# Patient Record
Sex: FEMALE | Race: WHITE
[De-identification: names, ages, dates, MRNs, and addresses within clinical notes are randomized per-mention and may not be internally consistent; named-entity substitution may affect disease eponyms.]

---

## 2017-01-03 ENCOUNTER — HOSPITAL ENCOUNTER (EMERGENCY)
Dept: HOSPITAL 61 - ER | Age: 82
Discharge: HOME | End: 2017-01-03
Payer: MEDICARE

## 2017-01-03 VITALS
SYSTOLIC BLOOD PRESSURE: 147 MMHG | DIASTOLIC BLOOD PRESSURE: 68 MMHG | SYSTOLIC BLOOD PRESSURE: 147 MMHG | DIASTOLIC BLOOD PRESSURE: 68 MMHG | SYSTOLIC BLOOD PRESSURE: 147 MMHG | DIASTOLIC BLOOD PRESSURE: 68 MMHG

## 2017-01-03 DIAGNOSIS — Z88.0: ICD-10-CM

## 2017-01-03 DIAGNOSIS — I10: ICD-10-CM

## 2017-01-03 DIAGNOSIS — E11.9: ICD-10-CM

## 2017-01-03 DIAGNOSIS — M79.604: Primary | ICD-10-CM

## 2017-01-03 DIAGNOSIS — E78.00: ICD-10-CM

## 2017-01-03 DIAGNOSIS — I95.9: ICD-10-CM

## 2017-01-03 PROCEDURE — 73590 X-RAY EXAM OF LOWER LEG: CPT

## 2017-01-03 PROCEDURE — 99284 EMERGENCY DEPT VISIT MOD MDM: CPT

## 2017-01-03 PROCEDURE — 73502 X-RAY EXAM HIP UNI 2-3 VIEWS: CPT

## 2017-01-03 NOTE — PHYS DOC
Past Medical History


Past Medical History:  Diabetes-Type II, High Cholesterol, Hypertension, 

Hypotension


Past Surgical History:  Cholecystectomy


Alcohol Use:  None


Drug Use:  None





Adult General


Chief Complaint


Chief Complaint:  LOWER EXT PAIN





HPI


HPI


Patient is a 81  year old female who presents with complaint of right lower 

extremity pain. Patient states that she has been having pain from the lower 

portion of her knee down to her ankle for the past 4 days. Patient states that 

the pain stays in the front over her shin bone. Patient denies any swelling to 

the right lower extremity or pain in her calf. Patient states that the pain has 

been getting progressively worse with weightbearing. Patient states that at her 

baseline she does not use a cane or walker, however she has had use a cane due 

to the pain over the past 2 days. Patient denies any recent injuries. Patient 

rates her pain currently as 9 out of 10 with weightbearing and 3 out of 10 with 

rest. Patient has been taking Tylenol at home with minimal relief in symptoms.





Review of Systems


Review of Systems





Constitutional: Denies fever or chills []


Eyes: Denies change in visual acuity, redness, or eye pain []


HENT: Denies nasal congestion or sore throat []


Respiratory: Denies cough or shortness of breath []


Cardiovascular: No additional information not addressed in HPI []


GI: Denies abdominal pain, nausea, vomiting, bloody stools or diarrhea []


: Denies dysuria or hematuria []


Musculoskeletal: Right lower leg pain []


Integument: Denies rash or skin lesions []


Neurologic: Denies headache, focal weakness or sensory changes []


Endocrine: Denies polyuria or polydipsia []





Allergies


Allergies





 Allergies








Coded Allergies Type Severity Reaction Last Updated Verified


 


  Penicillins Allergy Unknown  3/30/14 Yes











Physical Exam


Physical Exam





Constitutional: Well developed, well nourished, no acute distress, non-toxic 

appearance. []


HENT: Normocephalic, atraumatic, bilateral external ears normal, oropharynx 

moist, no oral exudates, nose normal. []


Eyes: PERRLA, EOMI, conjunctiva normal, no discharge. [] 


Neck: Normal range of motion, no tenderness, supple, no stridor. [] 


Cardiovascular:Heart rate regular rhythm, no murmur []


Lungs & Thorax:  Bilateral breath sounds clear to auscultation []


Abdomen: Bowel sounds normal, soft, no tenderness, no masses, no pulsatile 

masses. [] 


Skin: Warm, dry, no erythema, no rash. [] 


Back: No tenderness, no CVA tenderness. [] 


Extremities: Right anterior tibial plateau tenderness to palpation, mid tibial 

tenderness to palpation, right greater trochanter tenderness to palpation, no 

cyanosis, no clubbing, ROM intact, no edema. [] 


Neurologic: Alert and oriented X 3, normal motor function, normal sensory 

function, no focal deficits noted. []





Current Patient Data


Vital Signs





 Vital Signs








  Date Time  Temp Pulse Resp B/P Pulse Ox O2 Delivery O2 Flow Rate FiO2


 


1/3/17 13:06 97.7 78 18  96 Room Air  





 97.7       











EKG


EKG


Not performed []





Radiology/Procedures


Radiology/Procedures





 [place] [id number]


 [phone]


 


 IMAGING REPORT





 Signed





PATIENT: KAREN ZEPEDA ACCOUNT: [id number] MRN#: [id number]


: 1935 LOCATION: ER AGE: 81


SEX: F EXAM DT: 17 ACCESSION#: [id number]


STATUS: REG ER ORD. PHYSICIAN: SOHEILA BAZZI MD 


REASON: right hip and right lower leg pain since last friday, no known injury


PROCEDURE: TIBIA FIBULA RIGHT











Right tibia and fibula, 2 views, 1/3/2017:





History: Hip and lower leg pain





No fracture or destructive bony lesion is seen. There is minimal spurring at


the knee joint. Arterial calcifications are evident.








IMPRESSION: No acute abnormality is detected.

















DICTATED and SIGNED BY:     MORITZ,RICK S MD


DATE:     17 1440





CC: KATIA BRANNON; SOHEILA BAZZI MD ~





 [address] [address]


 [phone]


 


 IMAGING REPORT





 Signed





PATIENT: KAREN ZEPEDA ACCOUNT: [id number] MRN#: [id number]


: 1935 LOCATION: ER AGE: 81


SEX: F EXAM DT: 17 ACCESSION#: [id number]


STATUS: REG ER ORD. PHYSICIAN: SOHEILA BAZZI MD 


REASON: right hip and right lower leg pain since last friday, no known injury


PROCEDURE: HIP RIGHT 2V WITH PELVIS











Pelvis with right hip, 3 views, 1/3/2017:





History: Hip and lower leg pain





No fracture or dislocation is identified. The hip joint spaces are minimally


narrowed with mild marginal spurring. Mild degenerative changes are present in


the lower lumbar spine. Scattered arterial calcifications are evident.





IMPRESSION: No acute pelvic or right hip abnormality is detected.

















DICTATED and SIGNED BY:     MORITZ,RICK S MD


DATE:     17 1442





CC: KATIA BRANNON; SOHEILA BAZZI MD ~


[]





Course & Med Decision Making


Course & Med Decision Making


Pertinent Labs and Imaging studies reviewed. (See chart for details)





Patient's x-rays were negative for fracture. The patient may still have either 

a bony contusion or possibly a stress fracture that is unseen on the x-rays. At 

this time I recommended limited weightbearing to the affected extremity and use 

of roller walker cane to assist with ambulation. The patient was provided with 

a prescription for hydrocodone to help with pain. I advised follow-up in 5-7 

days a primary doctor and return to the emergency department for any worsening 

symptoms. Patient voiced understanding and in agreement with treatment plan.





Dragon Disclaimer


Dragon Disclaimer


This electronic medical record was generated, in whole or in part, using a 

voice recognition dictation system.





Departure


Departure


Impression:  


 Primary Impression:  


 Pain of right lower extremity


Disposition:   HOME, SELF-CARE


Condition:  IMPROVED


Referrals:  


KATIA BRANNON (PCP)


Patient Instructions:  Musculoskeletal Pain





Additional Instructions:


Follow-up with your primary doctor in the next 5-7 days. Limit weightbearing 

and walking on your right leg to necessary activities such as using the restroom

, dressing herself, or making food. Return to the emergency department for any 

worsening symptoms.


Scripts


Hydrocodone/Apap 5-325 (Norco 5-325 Tablet)1 Each Tablet1 Tab PO Q4-6HRS PRN 

PAIN #20 TAB


   Prov:SOHEILA BAZZI MD         1/3/17








SOHEILA BAZZI MD Vincenzo 3, 2017 15:14

## 2017-01-03 NOTE — RAD
Right tibia and fibula, 2 views, 1/3/2017:



History: Hip and lower leg pain



No fracture or destructive bony lesion is seen. There is minimal spurring at

the knee joint. Arterial calcifications are evident.





IMPRESSION: No acute abnormality is detected.

## 2017-01-03 NOTE — RAD
Pelvis with right hip, 3 views, 1/3/2017:



History: Hip and lower leg pain



No fracture or dislocation is identified. The hip joint spaces are minimally

narrowed with mild marginal spurring. Mild degenerative changes are present in

the lower lumbar spine. Scattered arterial calcifications are evident.



IMPRESSION: No acute pelvic or right hip abnormality is detected.

## 2017-01-23 ENCOUNTER — HOSPITAL ENCOUNTER (OUTPATIENT)
Dept: HOSPITAL 61 - KCIC MRI | Age: 82
Discharge: HOME | End: 2017-01-23
Attending: PHYSICIAN ASSISTANT
Payer: MEDICARE

## 2017-01-23 DIAGNOSIS — S83.281A: Primary | ICD-10-CM

## 2017-01-23 DIAGNOSIS — M17.11: ICD-10-CM
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
## 2017-01-23 DIAGNOSIS — M25.761: ICD-10-CM
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
## 2017-01-23 DIAGNOSIS — M25.461: ICD-10-CM
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
## 2017-01-23 DIAGNOSIS — M94.261: ICD-10-CM

## 2017-01-23 PROCEDURE — 73721 MRI JNT OF LWR EXTRE W/O DYE: CPT

## 2017-01-23 NOTE — KCIC
PROCEDURE 

MR of the right knee 

 

HISTORY 

Acute right knee pain. Pain is anterior and extends down the leg. 

 

COMPARISON 

None 

 

TECHNIQUE 

Standard noncontrast images are obtained. 

 

FINDINGS 

No evidence of a medial meniscal tear. 

There is some signal at the anterior horn of the lateral meniscus, with 

surface violation compatible with a small tear. 

The anterior and posterior cruciate ligaments are intact. Medial 

collateral ligament is intact. Iliotibial band unremarkable. Fibular 

collateral ligament, biceps femoris tendon and popliteus tendon are 

intact. The extensor mechanism is intact. Small joint effusion. 

No evidence of an osteochondral loose body. Small joint effusion. 

At least moderate chondromalacia of the medial and lateral joint 

compartments. There is a subchondral osteophyte of the weight-bearing 

lateral femoral condyle 

Severe chondromalacia patellofemoral joint. 

No bone lesion. No acute fracture. Tiny Baker cyst. 

 

IMPRESSION 

1. Tear of the anterior horn of the lateral meniscus.

2. Severe primary osteoarthritis.

 

 

 

Electronically signed by: King Aldana MD (Jan 23, 2017 12:07:28)

## 2017-03-29 ENCOUNTER — HOSPITAL ENCOUNTER (OUTPATIENT)
Dept: HOSPITAL 61 - MAMMO | Age: 82
Discharge: HOME | End: 2017-03-29
Attending: FAMILY MEDICINE
Payer: MEDICARE

## 2017-03-29 DIAGNOSIS — Z12.31: Primary | ICD-10-CM

## 2017-03-29 NOTE — RAD
DATE: 3/29/2017



EXAM: DIGITAL SCREEN BILAT W/CAD



HISTORY: Routine screening



COMPARISON: 3/1/2016



This study was interpreted with the benefit of Computerized Aided Detection

(CAD).





FINDINGS: There are scattered fibroglandular densities in both breasts. No new

or enlarging breast densities are seen. Numerous benign type calcifications

are again noted. No suspicious microcalcifications have developed.  





IMPRESSION: Stable mammograms without evidence of malignancy.





BI-RADS CATEGORY: 2 BENIGN FINDING(S)



RECOMMENDED FOLLOW-UP: 12M 12 MONTH FOLLOW-UP



PQRS compliance statement: Patient information was entered into a reminder

system with a target due date     for the next mammogram.



Mammography is a sensitive method for finding small breast cancers, but it

does not detect them all and is not a substitute for careful clinical

examination.  A negative mammogram does not negate a clinically suspicious

finding and should not result in delay in biopsying a clinically suspicious

abnormality.



"Our facility is accredited by the American [employer] Mammography

Program."

## 2017-07-20 ENCOUNTER — HOSPITAL ENCOUNTER (OUTPATIENT)
Dept: HOSPITAL 61 - ER | Age: 82
Setting detail: OBSERVATION
LOS: 1 days | Discharge: HOME | End: 2017-07-21
Attending: INTERNAL MEDICINE | Admitting: INTERNAL MEDICINE
Payer: MEDICARE

## 2017-07-20 VITALS — HEIGHT: 61 IN | BODY MASS INDEX: 27.38 KG/M2 | WEIGHT: 145 LBS

## 2017-07-20 VITALS — SYSTOLIC BLOOD PRESSURE: 170 MMHG | DIASTOLIC BLOOD PRESSURE: 64 MMHG

## 2017-07-20 DIAGNOSIS — M19.90: ICD-10-CM

## 2017-07-20 DIAGNOSIS — E11.22: ICD-10-CM

## 2017-07-20 DIAGNOSIS — E78.5: ICD-10-CM

## 2017-07-20 DIAGNOSIS — R07.89: Primary | ICD-10-CM

## 2017-07-20 DIAGNOSIS — Z82.49: ICD-10-CM

## 2017-07-20 DIAGNOSIS — N18.3: ICD-10-CM

## 2017-07-20 DIAGNOSIS — E03.9: ICD-10-CM

## 2017-07-20 DIAGNOSIS — Z83.3: ICD-10-CM

## 2017-07-20 DIAGNOSIS — E78.00: ICD-10-CM

## 2017-07-20 DIAGNOSIS — K21.9: ICD-10-CM

## 2017-07-20 DIAGNOSIS — I12.9: ICD-10-CM

## 2017-07-20 LAB
ALBUMIN SERPL-MCNC: 3.5 G/DL (ref 3.4–5)
ALP SERPL-CCNC: 140 U/L (ref 46–116)
ALT SERPL-CCNC: 47 U/L (ref 14–59)
ANION GAP SERPL CALC-SCNC: 12 MMOL/L (ref 6–14)
ANISOCYTOSIS BLD QL SMEAR: SLIGHT
AST SERPL-CCNC: 119 U/L (ref 15–37)
BACTERIA #/AREA URNS HPF: (no result) /HPF
BASOPHILS # BLD AUTO: 0.1 X10^3/UL (ref 0–0.2)
BASOPHILS NFR BLD: 1 % (ref 0–3)
BILIRUB DIRECT SERPL-MCNC: 0.1 MG/DL (ref 0–0.2)
BILIRUB SERPL-MCNC: 0.2 MG/DL (ref 0.2–1)
BILIRUB UR QL STRIP: NEGATIVE
BUN SERPL-MCNC: 40 MG/DL (ref 7–20)
CALCIUM SERPL-MCNC: 9.4 MG/DL (ref 8.5–10.1)
CHLORIDE SERPL-SCNC: 103 MMOL/L (ref 98–107)
CK SERPL-CCNC: 95 U/L (ref 26–192)
CKMB MASS: 0.7 NG/ML (ref 0–3.6)
CO2 SERPL-SCNC: 25 MMOL/L (ref 21–32)
CREAT SERPL-MCNC: 1.8 MG/DL (ref 0.6–1)
EOSINOPHIL NFR BLD: 4 % (ref 0–3)
ERYTHROCYTE [DISTWIDTH] IN BLOOD BY AUTOMATED COUNT: 20.6 % (ref 11.5–14.5)
GFR SERPLBLD BASED ON 1.73 SQ M-ARVRAT: 27 ML/MIN
GLUCOSE SERPL-MCNC: 202 MG/DL (ref 70–99)
GLUCOSE UR STRIP-MCNC: NEGATIVE MG/DL
HCT VFR BLD CALC: 33.5 % (ref 36–47)
HGB BLD-MCNC: 10.5 G/DL (ref 12–15.5)
INR PPP: 1 (ref 0.8–1.1)
LYMPHOCYTES # BLD: 3.3 X10^3/UL (ref 1–4.8)
LYMPHOCYTES NFR BLD AUTO: 32 % (ref 24–48)
MAGNESIUM SERPL-MCNC: 2.4 MG/DL (ref 1.8–2.4)
MCH RBC QN AUTO: 25 PG (ref 25–35)
MCHC RBC AUTO-ENTMCNC: 31 G/DL (ref 31–37)
MCV RBC AUTO: 81 FL (ref 79–100)
MONOCYTES NFR BLD: 11 % (ref 0–9)
NEUTROPHILS NFR BLD AUTO: 52 % (ref 31–73)
NITRITE UR QL STRIP: NEGATIVE
OVALOCYTES BLD QL SMEAR: (no result)
PH UR STRIP: 6 [PH]
PLATELET # BLD AUTO: 269 X10^3/UL (ref 140–400)
PLATELET # BLD EST: ADEQUATE 10*3/UL
POIKILOCYTOSIS BLD QL SMEAR: SLIGHT
POTASSIUM SERPL-SCNC: 4.7 MMOL/L (ref 3.5–5.1)
PROT SERPL-MCNC: 8.2 G/DL (ref 6.4–8.2)
PROT UR STRIP-MCNC: NEGATIVE MG/DL
PROTHROMBIN TIME: 12.6 SEC (ref 11.7–14)
RBC # BLD AUTO: 4.14 X10^6/UL (ref 3.5–5.4)
RBC #/AREA URNS HPF: 0 /HPF (ref 0–2)
SODIUM SERPL-SCNC: 140 MMOL/L (ref 136–145)
SP GR UR STRIP: 1.01
SQUAMOUS #/AREA URNS LPF: (no result) /LPF
UROBILINOGEN UR-MCNC: 0.2 MG/DL
WBC # BLD AUTO: 10.2 X10^3/UL (ref 4–11)

## 2017-07-20 PROCEDURE — 81001 URINALYSIS AUTO W/SCOPE: CPT

## 2017-07-20 PROCEDURE — 85027 COMPLETE CBC AUTOMATED: CPT

## 2017-07-20 PROCEDURE — 85610 PROTHROMBIN TIME: CPT

## 2017-07-20 PROCEDURE — 93005 ELECTROCARDIOGRAM TRACING: CPT

## 2017-07-20 PROCEDURE — 80061 LIPID PANEL: CPT

## 2017-07-20 PROCEDURE — 83880 ASSAY OF NATRIURETIC PEPTIDE: CPT

## 2017-07-20 PROCEDURE — 84443 ASSAY THYROID STIM HORMONE: CPT

## 2017-07-20 PROCEDURE — 96372 THER/PROPH/DIAG INJ SC/IM: CPT

## 2017-07-20 PROCEDURE — 80069 RENAL FUNCTION PANEL: CPT

## 2017-07-20 PROCEDURE — 80048 BASIC METABOLIC PNL TOTAL CA: CPT

## 2017-07-20 PROCEDURE — 82553 CREATINE MB FRACTION: CPT

## 2017-07-20 PROCEDURE — A6539 GC STOCKING WAISTLNGTH 18-30: HCPCS

## 2017-07-20 PROCEDURE — 80076 HEPATIC FUNCTION PANEL: CPT

## 2017-07-20 PROCEDURE — 83690 ASSAY OF LIPASE: CPT

## 2017-07-20 PROCEDURE — 96376 TX/PRO/DX INJ SAME DRUG ADON: CPT

## 2017-07-20 PROCEDURE — 96361 HYDRATE IV INFUSION ADD-ON: CPT

## 2017-07-20 PROCEDURE — 96375 TX/PRO/DX INJ NEW DRUG ADDON: CPT

## 2017-07-20 PROCEDURE — 87086 URINE CULTURE/COLONY COUNT: CPT

## 2017-07-20 PROCEDURE — 93306 TTE W/DOPPLER COMPLETE: CPT

## 2017-07-20 PROCEDURE — 96374 THER/PROPH/DIAG INJ IV PUSH: CPT

## 2017-07-20 PROCEDURE — 71010: CPT

## 2017-07-20 PROCEDURE — 78452 HT MUSCLE IMAGE SPECT MULT: CPT

## 2017-07-20 PROCEDURE — A9500 TC99M SESTAMIBI: HCPCS

## 2017-07-20 PROCEDURE — G0379 DIRECT REFER HOSPITAL OBSERV: HCPCS

## 2017-07-20 PROCEDURE — 82962 GLUCOSE BLOOD TEST: CPT

## 2017-07-20 PROCEDURE — 87186 SC STD MICRODIL/AGAR DIL: CPT

## 2017-07-20 PROCEDURE — 36415 COLL VENOUS BLD VENIPUNCTURE: CPT

## 2017-07-20 PROCEDURE — 93017 CV STRESS TEST TRACING ONLY: CPT

## 2017-07-20 PROCEDURE — 83036 HEMOGLOBIN GLYCOSYLATED A1C: CPT

## 2017-07-20 PROCEDURE — 83735 ASSAY OF MAGNESIUM: CPT

## 2017-07-20 PROCEDURE — 99285 EMERGENCY DEPT VISIT HI MDM: CPT

## 2017-07-20 PROCEDURE — 96360 HYDRATION IV INFUSION INIT: CPT

## 2017-07-20 PROCEDURE — G0378 HOSPITAL OBSERVATION PER HR: HCPCS

## 2017-07-20 PROCEDURE — 85007 BL SMEAR W/DIFF WBC COUNT: CPT

## 2017-07-20 PROCEDURE — 84484 ASSAY OF TROPONIN QUANT: CPT

## 2017-07-20 NOTE — PHYS DOC
Past Medical History


Past Medical History:  Diabetes-Type II, High Cholesterol, Hypertension, 

Hypotension


Past Surgical History:  Cholecystectomy


Alcohol Use:  None


Drug Use:  None





Adult General


Chief Complaint


Chief Complaint:  CHEST PAIN





HPI


HPI





Patient is a 82  year old  female who presents with substernal chest 

pain that radiates her back. She states that she was just sitting watching 

arose game when he came on Lasix for approximately 2 hours she did take some 

Tums and the pain resolved. She denies any shortness of breath nausea or 

diaphoresis associated with this. She states she's had this happen before but 

is been several months ago. She is usually followed by Dr. Galan states she has 

had a stress test but is been more than 3 years ago. She currently is pain-

free. She does take a baby aspirin daily. She has a family history of heart 

disease were all of her brothers have had heart attacks. She denies any history 

of smoking. She does have a history of hypertension dyslipidemia and diabetes.





Review of Systems


Review of Systems





Constitutional: Denies fever or chills []


Eyes: Denies change in visual acuity, redness, or eye pain []


HENT: Denies nasal congestion or sore throat []


Respiratory: Denies cough or shortness of breath []


Cardiovascular: No additional information not addressed in HPI []


GI: Denies abdominal pain, nausea, vomiting, bloody stools or diarrhea []


: Denies dysuria or hematuria []


Musculoskeletal: Denies back pain or joint pain []


Integument: Denies rash or skin lesions []


Neurologic: Denies headache, focal weakness or sensory changes []


Endocrine: Denies polyuria or polydipsia []





Current Medications


Current Medications





Current Medications








 Medications


  (Trade)  Dose


 Ordered  [facility]  Start Time


 Stop Time Status Last Admin


Dose Admin


 


 Aspirin


  ([facility]


 Aspirin)  324 mg  1X  ONCE  7/20/17 22:00


 7/20/17 22:01 DC 7/20/17 21:46


324 MG











Allergies


Allergies





Allergies








Coded Allergies Type Severity Reaction Last Updated Verified


 


  Penicillins Allergy Intermediate  1/3/17 Yes











Physical Exam


Physical Exam





Constitutional: Well developed, well nourished, no acute distress, non-toxic 

appearance. []


HENT: Normocephalic, atraumatic, bilateral external ears normal, oropharynx 

moist, no oral exudates, nose normal. []


Eyes: PERRLA, EOMI, conjunctiva normal, no discharge. [] 


Neck: Normal range of motion, no tenderness, supple, no stridor. [] 


Cardiovascular:Heart rate regular rhythm, no murmur []


Lungs & Thorax:  Bilateral breath sounds clear to auscultation []


Abdomen: Bowel sounds normal, soft, no tenderness, no masses, no pulsatile 

masses. [] 


Skin: Warm, dry, no erythema, no rash. [] 


Back: No tenderness, no CVA tenderness. [] 


Extremities: No tenderness, no cyanosis, no clubbing, ROM intact, no edema. [] 


Neurologic: Alert and oriented X 3, normal motor function, normal sensory 

function, no focal deficits noted. []


Psychologic: Affect normal, judgement normal, mood normal. []





Current Patient Data


Vital Signs





 Vital Signs








  Date Time  Temp Pulse Resp B/P (MAP) Pulse Ox O2 Delivery O2 Flow Rate FiO2


 


7/20/17 21:28 97.6 63 20 186/81 (116) 96 Room Air  





 97.6       








Lab Values





 Laboratory Tests








Test


  7/20/17


21:35


 


White Blood Count


  10.2 x10^3/uL


(4.0-11.0)


 


Red Blood Count


  4.14 x10^6/uL


(3.50-5.40)


 


Hemoglobin


  10.5 g/dL


(12.0-15.5)  L


 


Hematocrit


  33.5 %


(36.0-47.0)  L


 


Mean Corpuscular Volume


  81 fL ()


 


 


Mean Corpuscular Hemoglobin 25 pg (25-35)  


 


Mean Corpuscular Hemoglobin


Concent 31 g/dL


(31-37)


 


Red Cell Distribution Width


  20.6 %


(11.5-14.5)  H


 


Platelet Count


  269 x10^3/uL


(140-400)


 


Neutrophils (%) (Auto) 52 % (31-73)  


 


Lymphocytes (%) (Auto) 32 % (24-48)  


 


Monocytes (%) (Auto) 11 % (0-9)  H


 


Eosinophils (%) (Auto) 4 % (0-3)  H


 


Basophils (%) (Auto) 1 % (0-3)  


 


Neutrophils # (Auto)


  5.3 x10^3uL


(1.8-7.7)


 


Lymphocytes # (Auto)


  3.3 x10^3/uL


(1.0-4.8)


 


Monocytes # (Auto)


  1.1 x10^3/uL


(0.0-1.1)


 


Eosinophils # (Auto)


  0.4 x10^3/uL


(0.0-0.7)


 


Basophils # (Auto)


  0.1 x10^3/uL


(0.0-0.2)


 


Platelet Estimate


  Adequate


(ADEQUATE)


 


Poikilocytosis Slight  


 


Anisocytosis Slight  


 


Microcytosis   


 


Macrocytosis   


 


Ovalocytes Occ  


 


Prothrombin Time


  12.6 SEC


(11.7-14.0)


 


Prothrombin Time INR 1.0 (0.8-1.1)  


 


Sodium Level


  140 mmol/L


(136-145)


 


Potassium Level


  4.7 mmol/L


(3.5-5.1)


 


Chloride Level


  103 mmol/L


()


 


Carbon Dioxide Level


  25 mmol/L


(21-32)


 


Anion Gap 12 (6-14)  


 


Blood Urea Nitrogen


  40 mg/dL


(7-20)  H


 


Creatinine


  1.8 mg/dL


(0.6-1.0)  H


 


Estimated GFR


(Cockcroft-Gault) 27.0  


 


 


Glucose Level


  202 mg/dL


(70-99)  H


 


Calcium Level


  9.4 mg/dL


(8.5-10.1)


 


Magnesium Level


  2.4 mg/dL


(1.8-2.4)


 


Total Bilirubin


  0.2 mg/dL


(0.2-1.0)


 


Direct Bilirubin


  0.1 mg/dL


(0.0-0.2)


 


Aspartate Amino Transferase


(AST) 119 U/L


(15-37)  H


 


Alanine Aminotransferase (ALT)


  47 U/L (14-59)


 


 


Alkaline Phosphatase


  140 U/L


()  H


 


Creatine Kinase


  95 U/L


()


 


Creatine Kinase MB (Mass)


  0.7 ng/mL


(0.0-3.6)


 


Creatine Kinase MB Relative


Index 0.7 % (0-4)  


 


 


Troponin I Quantitative


  < 0.017 ng/mL


(0.000-0.055)


 


NT-Pro-B-Type Natriuretic


Peptide 608 pg/mL


(0-449)  H


 


Total Protein


  8.2 g/dL


(6.4-8.2)


 


Albumin


  3.5 g/dL


(3.4-5.0)


 


Lipase


  251 U/L


()


 


Thyroid Stimulating Hormone


(TSH) 0.392 uIU/mL


(0.358-3.74)





 Laboratory Tests


7/20/17 21:35








 Laboratory Tests


7/20/17 21:35











EKG


EKG


EKG shows sinus rhythm rate of 66 bpm without any ST elevations, T-wave 

inversions noted in leads 3, left axis deviation,  ms, as interpreted by 

me.





Radiology/Procedures


Radiology/Procedures


View chest x-ray did not show any focal consolidations, bony abnormalities, 

pneumothorax, as interpreted by me.


Impressions:


Chest pain


Dyslipidemia


Diabetes


Hypertension





Course & Med Decision Making


Course & Med Decision Making


Pertinent Labs and Imaging studies reviewed. (See chart for details)





EKG is nonacute, chest x-ray initial troponins are nonacute. I do not have any 

old labs to tell if her hemoglobin 10.5 and her creatinine 1.8 are her baseline 

or not. She's gotten pain currently. She is received a full dose aspirin. We'll 

admit to Dr. Whitfield, I spoke with Dr. Herbert regarding admission. Patient's 

agreeable plans in stable condition this time with interim orders written and I 

did place a cardiology consultation with Dr. Mejia, however did not call or 

speak with him.





Dragon Disclaimer


Dragon Disclaimer


This electronic medical record was generated, in whole or in part, using a 

voice recognition dictation system.





Departure


Departure


Impression:  


 Primary Impression:  


 Chest pain


Disposition:  09 ADMITTED AS INPATIENT


Admitting Physician:  Daniela Whitfield


Condition:  STABLE


Referrals:  


KATIA BRANNON (PCP)





Problem Qualifiers








 Primary Impression:  


 Chest pain


 Chest pain type:  unspecified  Qualified Codes:  R07.9 - Chest pain, 

unspecified








ALICIA WINKLER MD Jul 20, 2017 21:24

## 2017-07-21 VITALS — DIASTOLIC BLOOD PRESSURE: 62 MMHG | SYSTOLIC BLOOD PRESSURE: 167 MMHG

## 2017-07-21 VITALS — SYSTOLIC BLOOD PRESSURE: 163 MMHG | DIASTOLIC BLOOD PRESSURE: 53 MMHG

## 2017-07-21 VITALS — DIASTOLIC BLOOD PRESSURE: 66 MMHG | SYSTOLIC BLOOD PRESSURE: 139 MMHG

## 2017-07-21 VITALS — DIASTOLIC BLOOD PRESSURE: 67 MMHG | SYSTOLIC BLOOD PRESSURE: 165 MMHG

## 2017-07-21 LAB
ALBUMIN SERPL-MCNC: 3.3 G/DL (ref 3.4–5)
ANION GAP SERPL CALC-SCNC: 12 MMOL/L (ref 6–14)
ANION GAP SERPL CALC-SCNC: 9 MMOL/L (ref 6–14)
BASOPHILS # BLD AUTO: 0.1 X10^3/UL (ref 0–0.2)
BASOPHILS NFR BLD: 1 % (ref 0–3)
BUN SERPL-MCNC: 33 MG/DL (ref 7–20)
BUN SERPL-MCNC: 37 MG/DL (ref 7–20)
CALCIUM SERPL-MCNC: 9 MG/DL (ref 8.5–10.1)
CALCIUM SERPL-MCNC: 9.1 MG/DL (ref 8.5–10.1)
CHLORIDE SERPL-SCNC: 105 MMOL/L (ref 98–107)
CHLORIDE SERPL-SCNC: 108 MMOL/L (ref 98–107)
CHOLEST SERPL-MCNC: 135 MG/DL (ref 0–200)
CHOLEST/HDLC SERPL: 3.5 {RATIO}
CO2 SERPL-SCNC: 24 MMOL/L (ref 21–32)
CO2 SERPL-SCNC: 26 MMOL/L (ref 21–32)
CREAT SERPL-MCNC: 1.6 MG/DL (ref 0.6–1)
CREAT SERPL-MCNC: 1.6 MG/DL (ref 0.6–1)
EOSINOPHIL NFR BLD: 5 % (ref 0–3)
ERYTHROCYTE [DISTWIDTH] IN BLOOD BY AUTOMATED COUNT: 20.1 % (ref 11.5–14.5)
GFR SERPLBLD BASED ON 1.73 SQ M-ARVRAT: 30.9 ML/MIN
GFR SERPLBLD BASED ON 1.73 SQ M-ARVRAT: 30.9 ML/MIN
GLUCOSE SERPL-MCNC: 155 MG/DL (ref 70–99)
GLUCOSE SERPL-MCNC: 382 MG/DL (ref 70–99)
HCT VFR BLD CALC: 29 % (ref 36–47)
HDLC SERPL-MCNC: 39 MG/DL (ref 40–60)
HGB BLD-MCNC: 9.1 G/DL (ref 12–15.5)
LYMPHOCYTES # BLD: 2.6 X10^3/UL (ref 1–4.8)
LYMPHOCYTES NFR BLD AUTO: 29 % (ref 24–48)
MCH RBC QN AUTO: 25 PG (ref 25–35)
MCHC RBC AUTO-ENTMCNC: 32 G/DL (ref 31–37)
MCV RBC AUTO: 81 FL (ref 79–100)
MONOCYTES NFR BLD: 11 % (ref 0–9)
NEUTROPHILS NFR BLD AUTO: 54 % (ref 31–73)
NONHDLC SERPL-MCNC: 96 MG/DL (ref 0–129)
PHOSPHATE SERPL-MCNC: 4.8 MG/DL (ref 2.6–4.7)
PLATELET # BLD AUTO: 237 X10^3/UL (ref 140–400)
POTASSIUM SERPL-SCNC: 4.5 MMOL/L (ref 3.5–5.1)
POTASSIUM SERPL-SCNC: 5 MMOL/L (ref 3.5–5.1)
RBC # BLD AUTO: 3.6 X10^6/UL (ref 3.5–5.4)
SODIUM SERPL-SCNC: 141 MMOL/L (ref 136–145)
SODIUM SERPL-SCNC: 143 MMOL/L (ref 136–145)
TRIGL SERPL-MCNC: 114 MG/DL (ref 0–150)
WBC # BLD AUTO: 8.8 X10^3/UL (ref 4–11)

## 2017-07-21 RX ADMIN — COLESTIPOL HYDROCHLORIDE SCH GM: 1 TABLET ORAL at 17:10

## 2017-07-21 RX ADMIN — INSULIN ASPART SCH UNITS: 100 INJECTION, SOLUTION INTRAVENOUS; SUBCUTANEOUS at 12:00

## 2017-07-21 RX ADMIN — INSULIN ASPART SCH UNITS: 100 INJECTION, SOLUTION INTRAVENOUS; SUBCUTANEOUS at 17:20

## 2017-07-21 RX ADMIN — COLESTIPOL HYDROCHLORIDE SCH GM: 1 TABLET ORAL at 12:11

## 2017-07-21 NOTE — RAD
--------------- APPROVED REPORT --------------





Test Type:          Pharmacological

Stress Nurse/Tech: Munira Milan R.N.

Test Indications: chest pain

Cardiac History: Family history, Hypertension, Diabetes

Medications:     See Electronic Medical Record

Medical History: See Electronic Medical Record

Resting ECG:     NSR with freq. PAC's

Resting Heart Rate: 89 bpm

Resting Blood Pressure: 189/81mmHg

Pretest Chest Pain: No chest pain



Nurse/Tech Notes

S1S2, lungs sound clear

Consent: The procedure was explained to the patient in lay terms. Informed consent was witnessed. Adiel
eout was entered into [employer]. History and Stress Test performed by Munira Milan R.N.



Pharm. Details

Pharmacologic stress testing was performed using 0.4mg per 5ml of regadenoson given intravenously ove
r 7-10 seconds.



Stress Symptoms

Dyspnea



POST EXERCISE

Reason for Termination: Infusion complete

Max HR: 112 bpm

Max Blood Pressure: 177/69mmHg

Blood Pressure response to exercise: Normal blood pressure response during stress.

Chest Pain: No. 

Arrhythmia: No. no changes

ST Change: No. 



INTERPRETATION

Stress EKG Conclusion: The resting EKG shows a sinus rhythm with nonspecific ST-T wave changes.

The stress EKG shows no significant changes from baseline.

No EKG evidence of stress-induced ischemia.



Imaging Protocol

IMAGE PROTOCOL: Rest Tc-99m/stress Tc-99m 1 day



Rest:            Stress:         Viability:   

Radiopharm.Tc99m [id number] Sestamibi

Dose10.9mCi            35.1mCi            

Duration    15min.           10min.           

Img Date  07/21/2017 07/21/2017      

Inj-Img [id number].           60min.           



Rest Admin Site:IV - Left AntecubitalAdministrator:PRABHAKAR Hopson

Stress Admin Site: IV - Left AntecubitalAdministrator: PRABHAKAR Hopson



STRESS DATA

End Diast. Vol.60.0mlAv. Heart Rate88.0bpm

End Syst. Vol.8.0mlCO Index BSA0.0L/min

Myocardial Zfst300.0gEject. Vcseczcc79.0%



Stress Rates

Pk. Fill Rate3.28EDV/secLVtime Pk. Fill 174.86msec

Pk. Empty Rate4.68ESV/secLVtime Pk. Yzmqk423.98msec

1/3 Pk. Fill1.60EDV/sec



Stress Scores

Regional WT2.00Summed WT9.00

Regional WM0.00Summed WM0.00



LV Perfusion

The stress scans showed no significant defects.

The rest scans showed no significant defects.

Nuclear imaging shows no reversible ischemia or infarct.



Wall Motion

Left ventricular systolic function is normal with an ejection fraction of greater than 70%.



LV Perf. Quant

17 Seg. SSS7.00

17 Seg. SRS12.00

17 Seg. SDS0.00

Stress Defect Extent (% LAD)0.00Rest Defect Extent (% LAD)0.60Rev. Defect Extent (% LAD)0.00

Stress Defect Extent (% LCX) 42.50Rest Defect Extent (% LCX)45.00Rev. Defect Extent (% LCX)0.00

Stress Defect Extent (% RCA)0.00Rest Defect Extent (% RCA)36.70Rev. Defect Extent (% RCA)0.00

Stress Defect Extent (% JUNE)9.80Rest Defect Extent (% JUNE)24.10Rev. Defect Extent (% JUNE)0.00



Conclusion

1. No EKG evidence of stress-induced ischemia.

2. Nuclear imaging shows no reversible ischemia or infarct.

3. Good LV systolic function with an ejection fraction of greater than 70%.

4. Low risk Lexiscan nuclear stress test.

## 2017-07-21 NOTE — EKG
[facility]

              [address] [place] [address]

Test Date:    2017               Test Time:    21:28:33

Pat Name:     KAREN ZEPEDA             Department:   

Patient ID:   [id number]           Room:          

Gender:       F                        Technician:   

:          1935               Requested By: ALICIA WINKLER

Order Number: [id number]            Reading MD:     

                                 Measurements

Intervals                              Axis          

Rate:         66                       P:            0

SD:           238                      QRS:          -19

QRSD:         94                       T:            34

QT:           396                                    

QTc:          417                                    

                           Interpretive Statements

SINUS RHYTHM

ATRIAL PREMATURE COMPLEX(ES)

PROLONGED SD INTERVAL

LEFTWARD AXIS

QRS(T) CONTOUR ABNORMALITY

CONSIDER ANTEROLATERAL MYOCARDIAL DAMAGE

RI6.01          Unconfirmed report

No previous ECG available for comparison

## 2017-07-21 NOTE — RAD
Indication chest pain.



A single view of the chest was obtained and is compared to an examination

12/21/2009.



Mild cardiomegaly is noted. There is no congestive heart failure. There is no

focal infiltrate. Significant pleural fluid is not seen. There is no

pneumothorax. There has not, overall, been a substantial change when compared

to the previous exam.



IMPRESSION: Stable mild cardiomegaly.

                          No acute finding. No significant change

## 2017-07-21 NOTE — CARD
--------------- APPROVED REPORT --------------





EXAM: Two-dimensional and M-mode echocardiogram with Doppler and color Doppler.



Other Information 

Quality : Good



INDICATION

Chest Pain 



2D DIMENSIONS 

RVDd2.7 (2.9-3.5cm)Left Atrium(2D)3.8 (1.6-4.0cm)

IVSd1.2 (0.7-1.1cm)Aortic Root(2D)2.8 (2.0-3.7cm)

LVDd4.6 (3.9-5.9cm)LVOT Diameter2.0 (1.8-2.4cm)

PWd1.1 (0.7-1.1cm)LVDs3.3 (2.5-4.0cm)

FS (%) 28.4 %SV54.1 ml

LVEF(%)54.9 (>50%)



Aortic Valve

AoV Peak Len.153.2cm/sAoV VTI33.0cm

AO Peak GR.9.4mmHgLVOT Peak Len.111.7cm/s

LVOT  VTI 25.48cmAO Mean GR.5mmHg

DEION (VMAX)2.90sy0SGN   (VTI)2.52cm2



Mitral Valve

MV E Efmnlbdr73.3cm/sMV DECEL [id number]

MV A Kazvixdh181.6cm/sMV [id number]

E/A  Ratio0.7MVA (PHT)3.39cm2



TDI

E/Lateral E'9.8E/Medial E'14.9



Tricuspid Valve

TR P. Jzyqadaf651ur/sRAP [id number]

TR Peak Gr.58khXnJTNG13sdAp



Pulmonary Vein

S1 Hfkhwdfp62.7cm/sD2 Opqsfafh54.8cm/s

PVa [id number]



 LEFT VENTRICLE 

The left ventricle is normal size. There is mild concentric left ventricular hypertrophy. The left ve
ntricular systolic function is normal. The Ejection Fraction is 55-60%. There is normal LV segmental 
wall motion. Transmitral Doppler flow pattern is Grade I-abnormal relaxation pattern.



 RIGHT VENTRICLE 

The right ventricle is normal size. The right ventricular systolic function is normal.



 ATRIA 

The left atrium size is normal. The right atrium size is normal. The interatrial septum is intact wit
h no evidence for an atrial septal defect or patent foramen ovale as noted on 2-D or Doppler imaging.




 AORTIC VALVE 

The aortic valve is calcified but opens well. Doppler and Color Flow revealed trace aortic regurgitat
ion. There is no significant aortic valvular stenosis.



 MITRAL VALVE 

The mitral valve is normal in structure and function. There is no evidence of mitral valve prolapse. 
There is no mitral valve stenosis. Doppler and Color-flow revealed trace mitral regurgitation.



 TRICUSPID VALVE 

The tricuspid valve is normal in structure and function. Doppler and Color Flow revealed trace tricus
pid regurgitation. The PA pressure was estimated at 31 mmHg. There is no tricuspid valve stenosis.



 PULMONIC VALVE 

The pulmonary valve is normal in structure and function. Doppler and Color Flow revealed no pulmonic 
valvular regurgitation. There is no pulmonic valvular stenosis.



 GREAT VESSELS 

The aortic root is normal in size. The ascending aorta is normal in size. The IVC is normal in size a
nd collapses >50% with inspiration.



 PERICARDIAL EFFUSION 

There is a trace anterior pericardial effusion.



Critical Notification

Critical Value: No



<Conclusion>

The left ventricular systolic function is normal.

The Ejection Fraction is 55-60%.

There is normal LV segmental wall motion.

Transmitral Doppler flow pattern is Grade I-abnormal relaxation pattern.

Trace aortic regurgitation.

Trace mitral regurgitation.

Trace tricuspid regurgitation.

The PA pressure was estimated at 31 mmHg.

There is a trace anterior pericardial effusion.

## 2017-07-21 NOTE — PDOC1
HISTORY AND PHYSICAL


Chief Complaint


Chief Complaint


This is 82 year old female  ,pt of Dr Braden has been admitted with a chief 

complaint ofs with substernal chest pain that radiates her back. She states 

that she was just sitting watching arose game when he came on Lasix for 

approximately 2 hours she did take some Tums and the pain resolved. She denies 

any shortness of breath nausea or diaphoresis associated with this. She states 

she's had this happen before but is been several months ago. She is usually 

followed by Dr. Galan states she has had a stress test but is been more than 3 

years ago. She currently is pain-free. She does take a baby aspirin daily. She 

has a family history of heart disease were all of her brothers have had heart 

attacks. She denies any history of smoking. She does have a history of 

hypertension dyslipidemia and diabetes. No elevation of cardiac  enzymes or EKG 

changes


.


Problems:  





Past Medical History


Cardiovascular:  HTN


GI:  GERD


Endocrine:  Diabetes, Hypothyroidism





Past Surgical History


Past Surgical History:  Cholecystectomy





Past Family History


Family History:  Diabetes, Heart Disease





Past Social History


PSH


no smoking  or alcohol





Review of Symptoms


Review of Symptoms


General ROS: positive for heart burn and pain going to back


Psychological ROS: negative


Ophthalmic ROS: negative


ENT ROS: negative


Allergy and Immunology ROS: negative


Hematology and Lymphatic: negative


Endocrine ROS: negative


Respiratory ROS: no cold, cough, dyspnea.


Cardiovascular ROS: no chest pain or dyspnea on exertion


Gastrointestinal ROS: no abdominal pain, change in bowel habits, or black or 

bloody stools


Genito-Urinary ROS: no dysuria, trouble voiding, or hematuria


Musculoskeletal ROS: no pain


Neurological ROS: negative


Dermatological ROS: no rash


Medications





Current Medications


Aspirin (Children'S Aspirin) 324 mg 1X  ONCE PO  Last administered on 7/20/17at 

21:46;  Start 7/20/17 at 22:00;  Stop 7/20/17 at 22:01;  Status DC


Morphine Sulfate 2 mg PRN Q2HR  PRN IV SEVERE PAIN;  Start 7/20/17 at 22:30;  

Stop 7/21/17 at 22:29


Nitroglycerin (Nitrostat) 0.4 mg PRN Q5MIN  PRN SL CHEST PAIN;  Start 7/20/17 

at 22:30;  Stop 7/21/17 at 22:29


Ondansetron HCl (Zofran) 4 mg PRN Q8HRS  PRN IV NAUSEA/VOMITING;  Start 7/20/17 

at 22:30;  Stop 7/21/17 at 22:29


Allergy





 Allergies








Coded Allergies Type Severity Reaction Last Updated Verified


 


  Penicillins Allergy Intermediate  1/3/17 Yes











Physical Exam


Physical Exam


General appearance - alert,well appearing, and in no distress and oriented to 

person, place, and time


Mental Status - alert, oriented to person, place, and time, affect appropriate 

to mood


Head - normal


Chest - clear to auscultation, no wheezes, rales or rhonchi, symmetric air entry


Heart - S1 and S2 normal


Abdomen - soft, nontender, nondistended, no masses or organomegaly


Neurological - alert and oriented


Musculoskeletal - no muscular tenderness noted


Extremities - no pedal edema


Skin - warm and dry


Labs





Laboratory Tests








Test


  7/20/17


21:35 7/20/17


22:00 7/21/17


04:35 7/21/17


07:16


 


White Blood Count


  10.2 x10^3/uL


(4.0-11.0) 


  8.8 x10^3/uL


(4.0-11.0) 


 


 


Red Blood Count


  4.14 x10^6/uL


(3.50-5.40) 


  3.60 x10^6/uL


(3.50-5.40) 


 


 


Hemoglobin


  10.5 g/dL


(12.0-15.5) 


  9.1 g/dL


(12.0-15.5) 


 


 


Hematocrit


  33.5 %


(36.0-47.0) 


  29.0 %


(36.0-47.0) 


 


 


Mean Corpuscular Volume 81 fL ()   81 fL ()  


 


Mean Corpuscular Hemoglobin 25 pg (25-35)   25 pg (25-35)  


 


Mean Corpuscular Hemoglobin


Concent 31 g/dL


(31-37) 


  32 g/dL


(31-37) 


 


 


Red Cell Distribution Width


  20.6 %


(11.5-14.5) 


  20.1 %


(11.5-14.5) 


 


 


Platelet Count


  269 x10^3/uL


(140-400) 


  237 x10^3/uL


(140-400) 


 


 


Neutrophils (%) (Auto) 52 % (31-73)   54 % (31-73)  


 


Lymphocytes (%) (Auto) 32 % (24-48)   29 % (24-48)  


 


Monocytes (%) (Auto) 11 % (0-9)   11 % (0-9)  


 


Eosinophils (%) (Auto) 4 % (0-3)   5 % (0-3)  


 


Basophils (%) (Auto) 1 % (0-3)   1 % (0-3)  


 


Neutrophils # (Auto)


  5.3 x10^3uL


(1.8-7.7) 


  4.7 x10^3uL


(1.8-7.7) 


 


 


Lymphocytes # (Auto)


  3.3 x10^3/uL


(1.0-4.8) 


  2.6 x10^3/uL


(1.0-4.8) 


 


 


Monocytes # (Auto)


  1.1 x10^3/uL


(0.0-1.1) 


  1.0 x10^3/uL


(0.0-1.1) 


 


 


Eosinophils # (Auto)


  0.4 x10^3/uL


(0.0-0.7) 


  0.4 x10^3/uL


(0.0-0.7) 


 


 


Basophils # (Auto)


  0.1 x10^3/uL


(0.0-0.2) 


  0.1 x10^3/uL


(0.0-0.2) 


 


 


Platelet Estimate


  Adequate


(ADEQUATE) 


  


  


 


 


Poikilocytosis Slight    


 


Anisocytosis Slight    


 


Microcytosis     


 


Macrocytosis     


 


Ovalocytes Occ    


 


Prothrombin Time


  12.6 SEC


(11.7-14.0) 


  


  


 


 


Prothromb Time International


Ratio 1.0 (0.8-1.1) 


  


  


  


 


 


Sodium Level


  140 mmol/L


(136-145) 


  143 mmol/L


(136-145) 


 


 


Potassium Level


  4.7 mmol/L


(3.5-5.1) 


  5.0 mmol/L


(3.5-5.1) 


 


 


Chloride Level


  103 mmol/L


() 


  108 mmol/L


() 


 


 


Carbon Dioxide Level


  25 mmol/L


(21-32) 


  26 mmol/L


(21-32) 


 


 


Anion Gap 12 (6-14)   9 (6-14)  


 


Blood Urea Nitrogen


  40 mg/dL


(7-20) 


  37 mg/dL


(7-20) 


 


 


Creatinine


  1.8 mg/dL


(0.6-1.0) 


  1.6 mg/dL


(0.6-1.0) 


 


 


Estimated GFR


(Cockcroft-Gault) 27.0 


  


  30.9 


  


 


 


Glucose Level


  202 mg/dL


(70-99) 


  155 mg/dL


(70-99) 


 


 


Calcium Level


  9.4 mg/dL


(8.5-10.1) 


  9.0 mg/dL


(8.5-10.1) 


 


 


Magnesium Level


  2.4 mg/dL


(1.8-2.4) 


  


  


 


 


Total Bilirubin


  0.2 mg/dL


(0.2-1.0) 


  


  


 


 


Direct Bilirubin


  0.1 mg/dL


(0.0-0.2) 


  


  


 


 


Aspartate Amino Transf


(AST/SGOT) 119 U/L


(15-37) 


  


  


 


 


Alanine Aminotransferase


(ALT/SGPT) 47 U/L (14-59) 


  


  


  


 


 


Alkaline Phosphatase


  140 U/L


() 


  


  


 


 


Creatine Kinase


  95 U/L


() 


  


  


 


 


Creatine Kinase MB (Mass)


  0.7 ng/mL


(0.0-3.6) 


  


  


 


 


Creatine Kinase MB Relative


Index 0.7 % (0-4) 


  


  


  


 


 


Troponin I Quantitative


  < 0.017 ng/mL


(0.000-0.055) 


  < 0.017 ng/mL


(0.000-0.055) 


 


 


NT-Pro-B-Type Natriuretic


Peptide 608 pg/mL


(0-449) 


  


  


 


 


Total Protein


  8.2 g/dL


(6.4-8.2) 


  


  


 


 


Albumin


  3.5 g/dL


(3.4-5.0) 


  


  


 


 


Lipase


  251 U/L


() 


  


  


 


 


Thyroid Stimulating Hormone


(TSH) 0.392 uIU/mL


(0.358-3.74) 


  


  


 


 


Urine Collection Type  Unknown   


 


Urine Color  Yellow   


 


Urine Clarity  Clear   


 


Urine pH  6.0   


 


Urine Specific Gravity  1.010   


 


Urine Protein


  


  Negative mg/dL


(NEG-TRACE) 


  


 


 


Urine Glucose (UA)


  


  Negative mg/dL


(NEG) 


  


 


 


Urine Ketones (Stick)


  


  Negative mg/dL


(NEG) 


  


 


 


Urine Blood  Negative (NEG)   


 


Urine Nitrite  Negative (NEG)   


 


Urine Bilirubin  Negative (NEG)   


 


Urine Urobilinogen Dipstick


  


  0.2 mg/dL (0.2


mg/dL) 


  


 


 


Urine Leukocyte Esterase  Small (NEG)   


 


Urine RBC  0 /HPF (0-2)   


 


Urine WBC


  


  11-20 /HPF


(0-4) 


  


 


 


Urine Squamous Epithelial


Cells 


  Few /LPF 


  


  


 


 


Urine Bacteria


  


  Few /HPF


(0-FEW) 


  


 


 


Urine Mucus  Slight /LPF   


 


Glucose (Fingerstick)


  


  


  


  108 mg/dL


(70-99)








Laboratory Tests








Test


  7/20/17


21:35 7/20/17


22:00 7/21/17


04:35 7/21/17


07:16


 


White Blood Count


  10.2 x10^3/uL


(4.0-11.0) 


  8.8 x10^3/uL


(4.0-11.0) 


 


 


Red Blood Count


  4.14 x10^6/uL


(3.50-5.40) 


  3.60 x10^6/uL


(3.50-5.40) 


 


 


Hemoglobin


  10.5 g/dL


(12.0-15.5) 


  9.1 g/dL


(12.0-15.5) 


 


 


Hematocrit


  33.5 %


(36.0-47.0) 


  29.0 %


(36.0-47.0) 


 


 


Mean Corpuscular Volume 81 fL ()   81 fL ()  


 


Mean Corpuscular Hemoglobin 25 pg (25-35)   25 pg (25-35)  


 


Mean Corpuscular Hemoglobin


Concent 31 g/dL


(31-37) 


  32 g/dL


(31-37) 


 


 


Red Cell Distribution Width


  20.6 %


(11.5-14.5) 


  20.1 %


(11.5-14.5) 


 


 


Platelet Count


  269 x10^3/uL


(140-400) 


  237 x10^3/uL


(140-400) 


 


 


Neutrophils (%) (Auto) 52 % (31-73)   54 % (31-73)  


 


Lymphocytes (%) (Auto) 32 % (24-48)   29 % (24-48)  


 


Monocytes (%) (Auto) 11 % (0-9)   11 % (0-9)  


 


Eosinophils (%) (Auto) 4 % (0-3)   5 % (0-3)  


 


Basophils (%) (Auto) 1 % (0-3)   1 % (0-3)  


 


Neutrophils # (Auto)


  5.3 x10^3uL


(1.8-7.7) 


  4.7 x10^3uL


(1.8-7.7) 


 


 


Lymphocytes # (Auto)


  3.3 x10^3/uL


(1.0-4.8) 


  2.6 x10^3/uL


(1.0-4.8) 


 


 


Monocytes # (Auto)


  1.1 x10^3/uL


(0.0-1.1) 


  1.0 x10^3/uL


(0.0-1.1) 


 


 


Eosinophils # (Auto)


  0.4 x10^3/uL


(0.0-0.7) 


  0.4 x10^3/uL


(0.0-0.7) 


 


 


Basophils # (Auto)


  0.1 x10^3/uL


(0.0-0.2) 


  0.1 x10^3/uL


(0.0-0.2) 


 


 


Platelet Estimate


  Adequate


(ADEQUATE) 


  


  


 


 


Poikilocytosis Slight    


 


Anisocytosis Slight    


 


Microcytosis     


 


Macrocytosis     


 


Ovalocytes Occ    


 


Prothrombin Time


  12.6 SEC


(11.7-14.0) 


  


  


 


 


Prothromb Time International


Ratio 1.0 (0.8-1.1) 


  


  


  


 


 


Sodium Level


  140 mmol/L


(136-145) 


  143 mmol/L


(136-145) 


 


 


Potassium Level


  4.7 mmol/L


(3.5-5.1) 


  5.0 mmol/L


(3.5-5.1) 


 


 


Chloride Level


  103 mmol/L


() 


  108 mmol/L


() 


 


 


Carbon Dioxide Level


  25 mmol/L


(21-32) 


  26 mmol/L


(21-32) 


 


 


Anion Gap 12 (6-14)   9 (6-14)  


 


Blood Urea Nitrogen


  40 mg/dL


(7-20) 


  37 mg/dL


(7-20) 


 


 


Creatinine


  1.8 mg/dL


(0.6-1.0) 


  1.6 mg/dL


(0.6-1.0) 


 


 


Estimated GFR


(Cockcroft-Gault) 27.0 


  


  30.9 


  


 


 


Glucose Level


  202 mg/dL


(70-99) 


  155 mg/dL


(70-99) 


 


 


Calcium Level


  9.4 mg/dL


(8.5-10.1) 


  9.0 mg/dL


(8.5-10.1) 


 


 


Magnesium Level


  2.4 mg/dL


(1.8-2.4) 


  


  


 


 


Total Bilirubin


  0.2 mg/dL


(0.2-1.0) 


  


  


 


 


Direct Bilirubin


  0.1 mg/dL


(0.0-0.2) 


  


  


 


 


Aspartate Amino Transf


(AST/SGOT) 119 U/L


(15-37) 


  


  


 


 


Alanine Aminotransferase


(ALT/SGPT) 47 U/L (14-59) 


  


  


  


 


 


Alkaline Phosphatase


  140 U/L


() 


  


  


 


 


Creatine Kinase


  95 U/L


() 


  


  


 


 


Creatine Kinase MB (Mass)


  0.7 ng/mL


(0.0-3.6) 


  


  


 


 


Creatine Kinase MB Relative


Index 0.7 % (0-4) 


  


  


  


 


 


Troponin I Quantitative


  < 0.017 ng/mL


(0.000-0.055) 


  < 0.017 ng/mL


(0.000-0.055) 


 


 


NT-Pro-B-Type Natriuretic


Peptide 608 pg/mL


(0-449) 


  


  


 


 


Total Protein


  8.2 g/dL


(6.4-8.2) 


  


  


 


 


Albumin


  3.5 g/dL


(3.4-5.0) 


  


  


 


 


Lipase


  251 U/L


() 


  


  


 


 


Thyroid Stimulating Hormone


(TSH) 0.392 uIU/mL


(0.358-3.74) 


  


  


 


 


Urine Collection Type  Unknown   


 


Urine Color  Yellow   


 


Urine Clarity  Clear   


 


Urine pH  6.0   


 


Urine Specific Gravity  1.010   


 


Urine Protein


  


  Negative mg/dL


(NEG-TRACE) 


  


 


 


Urine Glucose (UA)


  


  Negative mg/dL


(NEG) 


  


 


 


Urine Ketones (Stick)


  


  Negative mg/dL


(NEG) 


  


 


 


Urine Blood  Negative (NEG)   


 


Urine Nitrite  Negative (NEG)   


 


Urine Bilirubin  Negative (NEG)   


 


Urine Urobilinogen Dipstick


  


  0.2 mg/dL (0.2


mg/dL) 


  


 


 


Urine Leukocyte Esterase  Small (NEG)   


 


Urine RBC  0 /HPF (0-2)   


 


Urine WBC


  


  11-20 /HPF


(0-4) 


  


 


 


Urine Squamous Epithelial


Cells 


  Few /LPF 


  


  


 


 


Urine Bacteria


  


  Few /HPF


(0-FEW) 


  


 


 


Urine Mucus  Slight /LPF   


 


Glucose (Fingerstick)


  


  


  


  108 mg/dL


(70-99)








Vitals





Vital Signs








  Date Time  Temp Pulse Resp B/P (MAP) Pulse Ox O2 Delivery O2 Flow Rate FiO2


 


7/21/17 07:00 98.2 64 18 165/67 (99) 98 Room Air  





 98.2       











VTE Prophylaxis


VTE Prophylaxis Devices:  Yes


VTE Pharmacological Prophylaxi:  No





Assessment


Assessment


chest pain  for cardiac evaluation


GERD


htn


Dm


hypothyroidism





Plan


Plan


serial enzymes 


ekg 


cxr -ve


cardiology consult


echo/stress test


home today   if above neg


For more details regarding further plans, please refer to the orders.











YUE MILES MD Jul 21, 2017 09:34

## 2017-07-21 NOTE — PDOC2
CARDIAC CONSULT


DATE OF CONSULT


Date of Consult


DATE: 7/21/17 


TIME: 09:27





REASON FOR CONSULT


Reason for Consult:


Chest pain





REFERRING PHYSICIAN


Referring Physician:


Dr. Leo





SOURCE


Source:  Chart review, Patient





HISTORY OF PRESENT ILLNESS


HISTORY OF PRESENT ILLNESS


This is an 81 yo female who presented with complaints of chest pain. Patient 

reports she was sitting relaxing in her recliner yesterday afternoon and 

developed pain in her lower chest/mid-epigastric region. Describes as stabbing 

in nature. Radiated under bilateral breast. Associated with pain across he 

central back. Worsened with deep breath. Took Tums with improvement. Pain last 

approximately 1.5 hrs and resolved prior to arrival to ED. Denies any 

associated dizziness, palpitations, diaphoresis, SOA, HOPE, or nausea/vomiting. 

Does have a history of hypertension and hyperlipidemia. Follows with Dr. Galan 

with [facility]. Last stress test or echo over 2 years ago. Reports compliance with 

medications.





PAST MEDICAL HISTORY


Cardiovascular:  HTN, Hyperlipidemia


Pulmonary:  No pertinent hx


CENTRAL NERVOUS SYSTEM:  Other (no pertinent hx)


GI:  GERD


Heme/Onc:  No pertinent hx


Hepatobiliary:  No pertinent hx


Psych:  No pertinent hx


Musculoskeletal:  Osteoarthritis


Rheumatologic:  No pertinent hx


Infectious disease:  No pertinent hx


ENT:  No pertinent hx


Renal/:  Chronic renal insuff


Endocrine:  Diabetes


Dermatology:  No pertinent hx





PAST SURGICAL HISTORY


Past Surgical History:  Cholecystectomy





FAMILY HISTORY


Family History:  Coronary Artery Disease (brothers ), Diabetes (mother )





SOCIAL HISTORY


Smoke:  No


ALCOHOL:  none


Lives:  with Family





CURRENT MEDICATIONS


CURRENT MEDICATIONS





Current Medications








 Medications


  (Trade)  Dose


 Ordered  Sig/Andrade


 Route


 PRN Reason  Start Time


 Stop Time Status Last Admin


Dose Admin


 


 Aspirin


  (Children'S


 Aspirin)  324 mg  1X  ONCE


 PO


   7/20/17 22:00


 7/20/17 22:01 DC 7/20/17 21:46


 











ALLERGIES


ALLERGIES:  


Coded Allergies:  


     Penicillins (Verified  Allergy, Intermediate, 1/3/17)





ROS


Review of System


14 point ROS conducted with pertinent positives noted above in HPI.





PHYSICAL EXAM


General:  Alert, Oriented X3, Cooperative, No acute distress


HEENT:  Atraumatic, Mucous membr. moist/pink


Lungs:  Clear to auscultation, Normal air movement


Heart:  Regular rate, Normal S1, Normal S2, No murmurs


Abdomen:  Soft, No tenderness


Extremities:  Other (trace edema of RLE)


Skin:  No breakdown, No significant lesion


Neuro:  Normal speech, Sensation intact


Psych/Mental Status:  Mental status NL, Mood NL


MUSCULOSKELETAL:  Osteoarthritic changes both hands





VITALS


VITALS





Vital Signs








  Date Time  Temp Pulse Resp B/P (MAP) Pulse Ox O2 Delivery O2 Flow Rate FiO2


 


7/21/17 07:00 98.2 64 18 165/67 (99) 98 Room Air  





 98.2       











LABS


Lab:





Laboratory Tests








Test


  7/20/17


21:35 7/20/17


22:00 7/21/17


04:35 7/21/17


07:16


 


White Blood Count


  10.2 x10^3/uL


(4.0-11.0) 


  8.8 x10^3/uL


(4.0-11.0) 


 


 


Red Blood Count


  4.14 x10^6/uL


(3.50-5.40) 


  3.60 x10^6/uL


(3.50-5.40) 


 


 


Hemoglobin


  10.5 g/dL


(12.0-15.5) 


  9.1 g/dL


(12.0-15.5) 


 


 


Hematocrit


  33.5 %


(36.0-47.0) 


  29.0 %


(36.0-47.0) 


 


 


Mean Corpuscular Volume 81 fL ()   81 fL ()  


 


Mean Corpuscular Hemoglobin 25 pg (25-35)   25 pg (25-35)  


 


Mean Corpuscular Hemoglobin


Concent 31 g/dL


(31-37) 


  32 g/dL


(31-37) 


 


 


Red Cell Distribution Width


  20.6 %


(11.5-14.5) 


  20.1 %


(11.5-14.5) 


 


 


Platelet Count


  269 x10^3/uL


(140-400) 


  237 x10^3/uL


(140-400) 


 


 


Neutrophils (%) (Auto) 52 % (31-73)   54 % (31-73)  


 


Lymphocytes (%) (Auto) 32 % (24-48)   29 % (24-48)  


 


Monocytes (%) (Auto) 11 % (0-9)   11 % (0-9)  


 


Eosinophils (%) (Auto) 4 % (0-3)   5 % (0-3)  


 


Basophils (%) (Auto) 1 % (0-3)   1 % (0-3)  


 


Neutrophils # (Auto)


  5.3 x10^3uL


(1.8-7.7) 


  4.7 x10^3uL


(1.8-7.7) 


 


 


Lymphocytes # (Auto)


  3.3 x10^3/uL


(1.0-4.8) 


  2.6 x10^3/uL


(1.0-4.8) 


 


 


Monocytes # (Auto)


  1.1 x10^3/uL


(0.0-1.1) 


  1.0 x10^3/uL


(0.0-1.1) 


 


 


Eosinophils # (Auto)


  0.4 x10^3/uL


(0.0-0.7) 


  0.4 x10^3/uL


(0.0-0.7) 


 


 


Basophils # (Auto)


  0.1 x10^3/uL


(0.0-0.2) 


  0.1 x10^3/uL


(0.0-0.2) 


 


 


Platelet Estimate


  Adequate


(ADEQUATE) 


  


  


 


 


Poikilocytosis Slight    


 


Anisocytosis Slight    


 


Microcytosis     


 


Macrocytosis     


 


Ovalocytes Occ    


 


Prothrombin Time


  12.6 SEC


(11.7-14.0) 


  


  


 


 


Prothromb Time International


Ratio 1.0 (0.8-1.1) 


  


  


  


 


 


Sodium Level


  140 mmol/L


(136-145) 


  143 mmol/L


(136-145) 


 


 


Potassium Level


  4.7 mmol/L


(3.5-5.1) 


  5.0 mmol/L


(3.5-5.1) 


 


 


Chloride Level


  103 mmol/L


() 


  108 mmol/L


() 


 


 


Carbon Dioxide Level


  25 mmol/L


(21-32) 


  26 mmol/L


(21-32) 


 


 


Anion Gap 12 (6-14)   9 (6-14)  


 


Blood Urea Nitrogen


  40 mg/dL


(7-20) 


  37 mg/dL


(7-20) 


 


 


Creatinine


  1.8 mg/dL


(0.6-1.0) 


  1.6 mg/dL


(0.6-1.0) 


 


 


Estimated GFR


(Cockcroft-Gault) 27.0 


  


  30.9 


  


 


 


Glucose Level


  202 mg/dL


(70-99) 


  155 mg/dL


(70-99) 


 


 


Calcium Level


  9.4 mg/dL


(8.5-10.1) 


  9.0 mg/dL


(8.5-10.1) 


 


 


Magnesium Level


  2.4 mg/dL


(1.8-2.4) 


  


  


 


 


Total Bilirubin


  0.2 mg/dL


(0.2-1.0) 


  


  


 


 


Direct Bilirubin


  0.1 mg/dL


(0.0-0.2) 


  


  


 


 


Aspartate Amino Transf


(AST/SGOT) 119 U/L


(15-37) 


  


  


 


 


Alanine Aminotransferase


(ALT/SGPT) 47 U/L (14-59) 


  


  


  


 


 


Alkaline Phosphatase


  140 U/L


() 


  


  


 


 


Creatine Kinase


  95 U/L


() 


  


  


 


 


Creatine Kinase MB (Mass)


  0.7 ng/mL


(0.0-3.6) 


  


  


 


 


Creatine Kinase MB Relative


Index 0.7 % (0-4) 


  


  


  


 


 


Troponin I Quantitative


  < 0.017 ng/mL


(0.000-0.055) 


  < 0.017 ng/mL


(0.000-0.055) 


 


 


NT-Pro-B-Type Natriuretic


Peptide 608 pg/mL


(0-449) 


  


  


 


 


Total Protein


  8.2 g/dL


(6.4-8.2) 


  


  


 


 


Albumin


  3.5 g/dL


(3.4-5.0) 


  


  


 


 


Lipase


  251 U/L


() 


  


  


 


 


Thyroid Stimulating Hormone


(TSH) 0.392 uIU/mL


(0.358-3.74) 


  


  


 


 


Urine Collection Type  Unknown   


 


Urine Color  Yellow   


 


Urine Clarity  Clear   


 


Urine pH  6.0   


 


Urine Specific Gravity  1.010   


 


Urine Protein


  


  Negative mg/dL


(NEG-TRACE) 


  


 


 


Urine Glucose (UA)


  


  Negative mg/dL


(NEG) 


  


 


 


Urine Ketones (Stick)


  


  Negative mg/dL


(NEG) 


  


 


 


Urine Blood  Negative (NEG)   


 


Urine Nitrite  Negative (NEG)   


 


Urine Bilirubin  Negative (NEG)   


 


Urine Urobilinogen Dipstick


  


  0.2 mg/dL (0.2


mg/dL) 


  


 


 


Urine Leukocyte Esterase  Small (NEG)   


 


Urine RBC  0 /HPF (0-2)   


 


Urine WBC


  


  11-20 /HPF


(0-4) 


  


 


 


Urine Squamous Epithelial


Cells 


  Few /LPF 


  


  


 


 


Urine Bacteria


  


  Few /HPF


(0-FEW) 


  


 


 


Urine Mucus  Slight /LPF   


 


Glucose (Fingerstick)


  


  


  


  108 mg/dL


(70-99)











ASSESSMENT/PLAN


ASSESSMENT/PLAN


1.  Chest pain, atypical; trop negative x2. EKG without significant acute 

changes. AMI ruled out.  


2.  Hypertension; labile. resume home antiHTN therapy


3.  Hyperlipidemia; statin therapy 


4.  Diabetes; controlled 


5.  CKD 3











Recommendations





Continue to trend troponin


Check lipids


Check echo to assess LV function


Given symptomatology and risk factors/strong family history of CAD, will 

proceed with MPI to r/o ischemia


Resume ASA, statin, ACE, and BB (following MPI). 


If MPI negative, may discharge from a CV perspective and follow up with primary 

cardiologist, Dr. Galan.


Problems:  











GAGE LLAMAS Jul 21, 2017 09:31

## 2017-11-19 ENCOUNTER — HOSPITAL ENCOUNTER (EMERGENCY)
Dept: HOSPITAL 61 - ER | Age: 82
Discharge: HOME | End: 2017-11-19
Payer: MEDICARE

## 2017-11-19 VITALS — BODY MASS INDEX: 28.66 KG/M2 | HEIGHT: 60 IN | WEIGHT: 146 LBS

## 2017-11-19 VITALS — DIASTOLIC BLOOD PRESSURE: 80 MMHG | SYSTOLIC BLOOD PRESSURE: 171 MMHG

## 2017-11-19 DIAGNOSIS — E11.9: ICD-10-CM

## 2017-11-19 DIAGNOSIS — N39.0: Primary | ICD-10-CM

## 2017-11-19 DIAGNOSIS — B02.9: ICD-10-CM

## 2017-11-19 DIAGNOSIS — Z90.49: ICD-10-CM

## 2017-11-19 DIAGNOSIS — Z88.0: ICD-10-CM

## 2017-11-19 DIAGNOSIS — I10: ICD-10-CM

## 2017-11-19 DIAGNOSIS — E78.00: ICD-10-CM

## 2017-11-19 LAB
BACTERIA #/AREA URNS HPF: (no result) /HPF
BILIRUB UR QL STRIP: NEGATIVE
GLUCOSE UR STRIP-MCNC: NEGATIVE MG/DL
NITRITE UR QL STRIP: POSITIVE
PH UR STRIP: 6 [PH]
PROT UR STRIP-MCNC: NEGATIVE MG/DL
RBC #/AREA URNS HPF: 0 /HPF (ref 0–2)
SP GR UR STRIP: 1.01
SQUAMOUS #/AREA URNS LPF: (no result) /LPF
UROBILINOGEN UR-MCNC: 0.2 MG/DL
WBC #/AREA URNS HPF: (no result) /HPF (ref 0–4)

## 2017-11-19 PROCEDURE — 81001 URINALYSIS AUTO W/SCOPE: CPT

## 2017-11-19 PROCEDURE — 87186 SC STD MICRODIL/AGAR DIL: CPT

## 2017-11-19 PROCEDURE — 87086 URINE CULTURE/COLONY COUNT: CPT

## 2017-11-19 PROCEDURE — 99284 EMERGENCY DEPT VISIT MOD MDM: CPT

## 2017-11-19 NOTE — PHYS DOC
Past Medical History


Past Medical History:  Diabetes-Type II, High Cholesterol, Hypertension


Past Surgical History:  Cholecystectomy


Alcohol Use:  None


Drug Use:  None





Adult General


Chief Complaint


Chief Complaint:  BACK PAIN - NO INJURY





[facility]


HPI





Patient is a 82  year old female presents to the emergency department stating 

she is having upper back pain and discomfort. She states that the back pain 

radiates down to her lower back area on bilateral sides. Patient states that 

she believes that her shingles that is causing her to have pain and discomfort. 

She states that sharp aching pain. Patient also states that she is having pain 

underneath her left breast. She states that this is not uncommon for her to 

have pain under the breast area however this is a different type of pain. She 

states the pain is pretty constant that started yesterday. She denies any 

rashes or any type of drainage or trauma to the site. Patient states that she 

had seen her primary care physician for her back pain and discomfort and was 

told that it was more muscle-type ache and pain. Patient presents today stating 

that she is concerned that she may have shingles.





Review of Systems


Review of Systems





Constitutional: Denies fever or chills []


Eyes: Denies change in visual acuity, redness, or eye pain []


HENT: Denies nasal congestion or sore throat []


Respiratory: Denies cough or shortness of breath []


Cardiovascular: No additional information not addressed in HPI []


GI: Denies abdominal pain, nausea, vomiting, bloody stools or diarrhea []


: Denies dysuria or hematuria []


Musculoskeletal: Upper back pain or joint pain []


Integument: Denies rash or skin lesions. Patient with pain to the left rest area


Neurologic: Denies headache, focal weakness or sensory changes []


Endocrine: Denies polyuria or polydipsia []





All other systems were reviewed and found to be within normal limits, except as 

documented in this note.





Allergies


Allergies





Allergies








Coded Allergies Type Severity Reaction Last Updated Verified


 


  Penicillins Allergy Intermediate  1/3/17 Yes











Physical Exam


Physical Exam





Constitutional: Well developed, well nourished, no acute distress, non-toxic 

appearance. []


HENT: Normocephalic, atraumatic, bilateral external ears normal, oropharynx 

moist, no oral exudates, nose normal. []


Eyes: PERRLA, EOMI, conjunctiva normal, no discharge. [] 


Neck: Normal range of motion, no tenderness, supple, no stridor. [] 


Cardiovascular:Heart rate regular rhythm, no murmur []


Lungs & Thorax:  Bilateral breath sounds clear to auscultation. Tenderness 

noted to the area under the left breast.


Skin: Warm, dry, no erythema, no rash. [] 


Back: Generalized back tenderness, no CVA tenderness. [] 


Extremities: No tenderness, no cyanosis, no clubbing, ROM intact, no edema. [] 


Neurologic: Alert and oriented X 3, normal motor function, normal sensory 

function, no focal deficits noted. []


Psychologic: Affect normal, judgement normal, mood normal. []





Current Patient Data


Vital Signs





 Vital Signs








  Date Time  Temp Pulse Resp B/P (MAP) Pulse Ox O2 Delivery O2 Flow Rate FiO2


 


11/19/17 10:02 98.0 66 18  95 Room Air  





 98.0       








Lab Values





 Laboratory Tests








Test


  11/19/17


10:15


 


Urine Collection Type Unknown  


 


Urine Color Yellow  


 


Urine Clarity Clear  


 


Urine pH 6.0  


 


Urine Specific Gravity 1.015  


 


Urine Protein


  Negative mg/dL


(NEG-TRACE)


 


Urine Glucose (UA)


  Negative mg/dL


(NEG)


 


Urine Ketones (Stick)


  Negative mg/dL


(NEG)


 


Urine Blood


  Negative (NEG)


 


 


Urine Nitrite


  Positive (NEG)


 


 


Urine Bilirubin


  Negative (NEG)


 


 


Urine Urobilinogen Dipstick


  0.2 mg/dL (0.2


mg/dL)


 


Urine Leukocyte Esterase Small (NEG)  


 


Urine RBC 0 /HPF (0-2)  


 


Urine WBC


  1-4 /HPF (0-4)


 


 


Urine Squamous Epithelial


Cells Few /LPF  


 


 


Urine Bacteria


  Many /HPF


(0-FEW)











EKG


EKG


[]





Radiology/Procedures


Radiology/Procedures


[]





Course & Med Decision Making


Course & Med Decision Making


Pertinent Labs and Imaging studies reviewed. (See chart for details)


Patient will be placed on acyclovir discharge. Urine was positive for urinary 

tract infection. Positive for leukocyte Estrace positive for nitrates. Patient 

will be placed on Macrobid for the urinary tract infection with recommendations 

for plenty of fluids such as water and cranberry juice. Recommended her to 

avoid cranberry juice cocktail, carbonate beverages, citrus fruits, alcohol, 

and caffeine as is her considered irritants to the bladder. Patient was 

instructed to follow-up with her primary care physician in the next 7-10 days. 

Signs and symptoms to return back to emergency department has been provided.








[I've spoken with the patient and/or caregivers. I've explained the patient's 

condition, diagnosis and treatment plan based on information available to me at 

this time. I've answered the patient's and/or caregivers questions and 

addressed any concerns. The patient and/or caregivers have a good understanding 

the patient's diagnosis, condition and treatment plan as can be expected at 

this point. Vital signs have been stabilized. The patient's condition is stable 

for discharge from the emergency department.





The patient will pursue further outpatient evaluation with her primary care 

provider or other designated consulting physician as outlined in the discharge 

instructions. Patient and/or caregivers are agreeable to this plan of care and 

follow-up instructions have been explained in detail. The patient and/or 

caregivers have received these instructions in written format and expressed 

understanding of these discharge instructions. The patient and her caregivers 

are aware that if any significant change in condition or worsening of symptoms 

should prompt him to immediately return to this of the closest emergency 

department.  If an emergent department is not readily available I would 

encourage him to call 911.


]





Dragon Disclaimer


Dragon Disclaimer


This electronic medical record was generated, in whole or in part, using a 

voice recognition dictation system.





Departure


Departure


Impression:  


 Primary Impression:  


 UTI (urinary tract infection)


 Additional Impression:  


 Shingles


Disposition:  01 HOME, SELF-CARE


Condition:  STABLE


Referrals:  


KATIA BRANNON (PCP)


Patient Instructions:  Shingles, Easy-to-Read, Urinary Tract Infection, Easy-to-

Read





Additional Instructions:  


Activity as tolerated.


Drink plenty of fluids such as water and cranberry juice.


Avoid cranberry juice cocktail, carbonate beverages, citrus fruits, and now 

causes considered irritants to the bladder.


Medication as prescribed.


Follow-up through primary care physician X7 to 10 days.


Return back to the emergency department for signs and symptoms become worse.


Scripts


Nitrofurantoin Monohyd/M-Cryst (MACROBID 100 MG CAPSULE) 100 Mg Capsule


1 CAP PO BID, #14 CAP


   Prov: ROME SIMMONS APRN         11/19/17 


Acyclovir (ACYCLOVIR) 800 Mg Tablet


1 TAB PO 5XDAY, #50 TAB


   Prov: ROME SIMMONS APRN         11/19/17





Problem Qualifiers








 Primary Impression:  


 UTI (urinary tract infection)


 Urinary tract infection type:  site unspecified  Hematuria presence:  without 

hematuria  Qualified Codes:  N39.0 - Urinary tract infection, site not specified


 Additional Impression:  


 Shingles


 Herpes zoster complications:  without complications  Qualified Codes:  B02.9 - 

Zoster without complications








ROME SIMMONS APRN Nov 19, 2017 11:11

## 2018-03-27 ENCOUNTER — HOSPITAL ENCOUNTER (OUTPATIENT)
Dept: HOSPITAL 61 - KCIC DEXA | Age: 83
Discharge: HOME | End: 2018-03-27
Attending: FAMILY MEDICINE
Payer: MEDICARE

## 2018-03-27 DIAGNOSIS — Z78.0: ICD-10-CM

## 2018-03-27 DIAGNOSIS — N28.9: ICD-10-CM

## 2018-03-27 DIAGNOSIS — E05.90: ICD-10-CM

## 2018-03-27 DIAGNOSIS — E11.9: Primary | ICD-10-CM

## 2018-03-27 PROCEDURE — 77080 DXA BONE DENSITY AXIAL: CPT

## 2018-04-02 ENCOUNTER — HOSPITAL ENCOUNTER (OUTPATIENT)
Dept: HOSPITAL 61 - MAMMO | Age: 83
Discharge: HOME | End: 2018-04-02
Attending: FAMILY MEDICINE
Payer: MEDICARE

## 2018-04-02 DIAGNOSIS — Z12.31: Primary | ICD-10-CM

## 2018-04-02 PROCEDURE — 77067 SCR MAMMO BI INCL CAD: CPT

## 2018-08-21 VITALS
DIASTOLIC BLOOD PRESSURE: 69 MMHG | SYSTOLIC BLOOD PRESSURE: 138 MMHG | DIASTOLIC BLOOD PRESSURE: 69 MMHG | SYSTOLIC BLOOD PRESSURE: 138 MMHG | SYSTOLIC BLOOD PRESSURE: 138 MMHG | SYSTOLIC BLOOD PRESSURE: 138 MMHG | DIASTOLIC BLOOD PRESSURE: 69 MMHG | DIASTOLIC BLOOD PRESSURE: 69 MMHG

## 2019-04-04 ENCOUNTER — HOSPITAL ENCOUNTER (OUTPATIENT)
Dept: HOSPITAL 61 - MAMMO | Age: 84
Discharge: HOME | End: 2019-04-04
Attending: FAMILY MEDICINE
Payer: MEDICARE

## 2019-04-04 DIAGNOSIS — Z12.31: Primary | ICD-10-CM

## 2019-04-04 PROCEDURE — 77067 SCR MAMMO BI INCL CAD: CPT

## 2019-04-04 PROCEDURE — 77063 BREAST TOMOSYNTHESIS BI: CPT

## 2019-04-05 NOTE — RAD
DATE: 4/4/2019



EXAM: MAMMO CHRISTINE SCREENING BILATERAL



HISTORY: Routine screening



COMPARISON: 4/2/2018



This study was interpreted with the benefit of Computerized Aided Detection

(CAD).



Breast Density: HETERO The breast parenchyma is heterogenously dense, which

could reduce sensitivity of mammography. Breast parenchyma level C.



FINDINGS: 2-D and 3-D tomosynthesis imaging was performed in CC and MLO

projections.  The fibroglandular tissues are quite heterogeneous.  There is

mild architectural distortion related to a small density laterally in the

right breast.  This appears to be unchanged since multiple previous studies. 

No new or enlarging breast densities are seen.  Numerous benign type

calcifications are again noted.



IMPRESSION: Stable mammograms without evidence of malignancy.





BI-RADS CATEGORY: 2 BENIGN FINDING(S)



RECOMMENDED FOLLOW-UP: 12M 12 MONTH FOLLOW-UP



PQRS compliance statement: Patient information was entered into a reminder

system with a target due date     for the next mammogram.



Mammography is a sensitive method for finding small breast cancers, but it

does not detect them all and is not a substitute for careful clinical

examination.  A negative mammogram does not negate a clinically suspicious

finding and should not result in delay in biopsying a clinically suspicious

abnormality.



"Our facility is accredited by the American [employer] Mammography

Program."

## 2020-06-17 ENCOUNTER — HOSPITAL ENCOUNTER (OUTPATIENT)
Dept: HOSPITAL 61 - MAMMO | Age: 85
End: 2020-06-17
Attending: FAMILY MEDICINE
Payer: MEDICARE

## 2020-06-17 DIAGNOSIS — Z12.31: Primary | ICD-10-CM

## 2020-06-17 PROCEDURE — 77067 SCR MAMMO BI INCL CAD: CPT

## 2020-06-17 PROCEDURE — 77063 BREAST TOMOSYNTHESIS BI: CPT

## 2020-06-18 NOTE — RAD
DATE: 6/17/2020 11:07 AM



EXAM: MAMMO CHRISTINE SCREENING BILATERAL



HISTORY:  Screening. Patient is 85 years old and denies a history of previous

breast biopsy, or diagnosis of malignancy or high-risk lesions in the breast.



COMPARISON: 4/4/2019, 4/2/2018.



Bilateral CC and MLO views of the breasts were performed. Bilateral breast

tomosynthesis was performed in CC and MLO projections.



Computer-aided detection was utilized.





FINDINGS:



Breast Density: HETERO  The breast parenchyma Is heterogeneously dense, which

could reduce sensitivity of mammography. Breast parenchyma level C



In the middle third upper outer quadrant right breast at the approximate 9:30

o'clock position 8 cm from the nipple is a 2.7 cm focal asymmetry with

associated distortion and coarse heterogeneous calcifications near its

posterior margin that needs additional imaging evaluation with a full-field

3-D lateral view, and targeted right breast ultrasound. On the tomographic

image series, it is best visualized on image 30 of 52 on the CC series, and on

image 32 of 64 on the MLO series.



At the superior posterior left breast, approximate 12:00 position 13 cm from

the nipple, an asymmetry with associated architectural distortion needs

additional imaging with a full-field lateral view, exaggerated CC lateral view

and targeted left breast ultrasound. Rolled views might be helpful as well.

This is best visualized superiorly on the MLO view on image 43 of 64 and the

associated distortion is best visualized on image 29 of 55 on the CC

tomographic series although it overlaps with the retroglandular fat and is not

as well seen on cc projection.





IMPRESSION: Bilateral breast focal asymmetry, findings for which additional

imaging is advised. 



BI-RADS CATEGORY: 0 INCOMPLETE: NEEDS ADDITIONAL IMAGING EVALUATION AND/OR

PRIOR MAMMOGRAMS FOR COMPARISON.



RECOMMENDED FOLLOW-UP: ADD ADDITIONAL IMAGING The patient will be contacted to

return for additional imaging and a supplemental report will follow.



PQRS compliance statement: Patient information was entered into a reminder

system with a target due date for the next mammogram.



Mammography is a sensitive method for finding small breast cancers, but it

does not detect them all and is not a substitute for careful clinical

examination.  A negative mammogram does not negate a clinically suspicious

finding and should not result in delay in biopsying a clinically suspicious

abnormality.



"Our facility is accredited by the American [employer] Mammography

Program."

## 2020-06-30 ENCOUNTER — HOSPITAL ENCOUNTER (OUTPATIENT)
Dept: HOSPITAL 61 - MAMMO | Age: 85
Discharge: HOME | End: 2020-06-30
Attending: FAMILY MEDICINE
Payer: MEDICARE

## 2020-06-30 DIAGNOSIS — R92.2: Primary | ICD-10-CM

## 2020-06-30 DIAGNOSIS — I51.7: ICD-10-CM

## 2020-06-30 PROCEDURE — 77066 DX MAMMO INCL CAD BI: CPT

## 2020-06-30 PROCEDURE — 71046 X-RAY EXAM CHEST 2 VIEWS: CPT

## 2020-07-01 NOTE — RAD
EXAMINATION:  DIGITAL DIAGNOSTIC BILATERAL, BREAST BILATERAL

 

History: Reason: ABNORMAL MAMMOGRAM / Spl. Instructions:  / History: 

 

Comparison:  1/28/2013, 1/10/2012, 1/6/2011, 2/4/2014, 2/5/2015, 3/1/2016,

3/29/2017, 04/02/2018, 04/04/2019, 6/17/2020 mammograms. 

 

Technique: Bilateral digital diagnostic mammogram views were obtained.  

CAD was utilized. 3-D tomosynthesis images were acquired.

 

 

Findings: 

Breast Tissue Density B : There are scattered areas of fibroglandular 

density.

 

The distortion involving the right upper outer breast appears similar and 

correlation with prior exams. It persists on spot compression imaging. 

Distortion involving the left upper breast also similar on prior exams.

 

Limited ultrasound imaging of the right upper outer breast and axilla as 

well as the left upper inner breast and axilla was performed. No masses or

cysts identified. No suspicious axillary lymph nodes..

 

IMPRESSION:

No mammographic evidence of malignancy. Recommend routine screening. No 

suspicious interval changes are suspected upon correlation multiple 

previous exams. No masses involving the sites of interest on ultrasound 

exam either.

 

BI-RADS category 1:  Negative.

 

The images were reviewed with computer aided detection.

 

Patient information is entered into the reminder system with a target due 

date for the next screening mammogram.

 

Mammography is the most sensitive method for finding small breast cancers,

but it does not detect them all and is not a substitute for careful 

clinical examination. A negative mammogram does not negate a clinically 

suspicious finding and should not result in delay in biopsying a 

clinically suspicious abnormality.

 

 "Our facility is accredited by the American College [employer] 

Mammography Program."

 

Electronically signed by: Adriano Brandt MD (7/1/2020 4:14 PM) [facility]

## 2021-10-28 ENCOUNTER — HOSPITAL ENCOUNTER (OUTPATIENT)
Dept: HOSPITAL 61 - US | Age: 86
End: 2021-10-28
Attending: INTERNAL MEDICINE
Payer: MEDICARE

## 2021-10-28 DIAGNOSIS — N28.1: Primary | ICD-10-CM

## 2021-10-28 DIAGNOSIS — N19: ICD-10-CM

## 2021-10-28 LAB
ANION GAP SERPL CALC-SCNC: 9 MMOL/L (ref 6–14)
BUN SERPL-MCNC: 31 MG/DL (ref 7–20)
CALCIUM SERPL-MCNC: 8.6 MG/DL (ref 8.5–10.1)
CHLORIDE SERPL-SCNC: 100 MMOL/L (ref 98–107)
CO2 SERPL-SCNC: 25 MMOL/L (ref 21–32)
CREAT SERPL-MCNC: 1.8 MG/DL (ref 0.6–1)
GFR SERPLBLD BASED ON 1.73 SQ M-ARVRAT: 26.7 ML/MIN
GLUCOSE SERPL-MCNC: 346 MG/DL (ref 70–99)
POTASSIUM SERPL-SCNC: 4.4 MMOL/L (ref 3.5–5.1)
SODIUM SERPL-SCNC: 134 MMOL/L (ref 136–145)

## 2021-10-28 PROCEDURE — 80048 BASIC METABOLIC PNL TOTAL CA: CPT

## 2021-10-28 PROCEDURE — 36415 COLL VENOUS BLD VENIPUNCTURE: CPT

## 2021-10-28 PROCEDURE — 76770 US EXAM ABDO BACK WALL COMP: CPT

## 2021-10-28 NOTE — RAD
EXAM: Renal sonogram.



HISTORY: Renal failure.



TECHNIQUE: Sonographic imaging of the kidneys and bladder was performed.



COMPARISON: None.



FINDINGS: The kidneys are normal in size. There is no hydronephrosis. There is renal cortical thinnin
g. There is echogenic renal parenchyma. There are simple renal cysts measuring 2.7 cm on the right an
d 2.8 cm on the left. There is no hydronephrosis. The prevoid bladder volume is 59 cc. The left urete
ral jet is not seen during the exam. The right ureteral jet is unremarkable.



IMPRESSION:

1. Echogenic renal parenchyma, a finding which can be seen with medical renal disease.

2. Bilateral renal cortical thinning.

3. Simple renal cysts. Follow-up is not routinely performed for simple cysts.



Electronically signed by: Antonieta hPelps MD (10/28/2021 11:24 AM) [id number]

## 2022-01-29 ENCOUNTER — HOSPITAL ENCOUNTER (EMERGENCY)
Dept: HOSPITAL 61 - ER | Age: 87
Discharge: HOME | End: 2022-01-29
Payer: MEDICARE

## 2022-01-29 VITALS
DIASTOLIC BLOOD PRESSURE: 50 MMHG | SYSTOLIC BLOOD PRESSURE: 168 MMHG | DIASTOLIC BLOOD PRESSURE: 50 MMHG | SYSTOLIC BLOOD PRESSURE: 168 MMHG

## 2022-01-29 VITALS — HEIGHT: 60 IN | BODY MASS INDEX: 30.3 KG/M2 | WEIGHT: 154.32 LBS

## 2022-01-29 DIAGNOSIS — E11.9: ICD-10-CM

## 2022-01-29 DIAGNOSIS — R09.89: Primary | ICD-10-CM

## 2022-01-29 DIAGNOSIS — I10: ICD-10-CM

## 2022-01-29 DIAGNOSIS — E78.00: ICD-10-CM

## 2022-01-29 DIAGNOSIS — Z88.0: ICD-10-CM

## 2022-01-29 PROCEDURE — 99281 EMR DPT VST MAYX REQ PHY/QHP: CPT

## 2022-01-29 NOTE — PHYS DOC
Past Medical History


Past Medical History:  Diabetes-Type II, High Cholesterol, Hypertension, Other


Additional Past Medical Histor:  SHINGLES


Past Surgical History:  Cholecystectomy, Other


Additional Past Surgical Histo:  moles off back


Smoking Status:  Never Smoker


Alcohol Use:  None


Drug Use:  None





General Adult


EDM:


Chief Complaint:  SWALLOWED FORIEGN BODY





HPI:


HPI:





Patient is a 86  year old female presents with a chief complaint of sore throat 

and foreign body sensation in her throat.  1830 hrs. patient was eating meatba

lls when she suddenly felt as if she had an esophageal food bolus obstruction.  

Patient felt discomfort in her left throat but was able to swallow.  Patient did

not have any issues breathing or handling her oral secretions.





At the time my exam patient states she feels better.  She feels as if the 

foreign body sensation has resolved.  Patient does complain of some throat 

irritation or sensation of swelling.  Patient tolerated p.o. without issue.





Review of Systems:


Review of Systems:


Constitutional:   Denies fever or chills. []


Eyes:   Denies change in visual acuity. []


HENT:   Denies nasal congestion or sore throat. [] 


Respiratory:   Denies cough or shortness of breath. [] 


Cardiovascular:   Denies chest pain or edema. [] 


GI:   Denies abdominal pain, nausea, vomiting, bloody stools or diarrhea. [] 


:  Denies dysuria. [] 


Musculoskeletal:   Denies back pain or joint pain. [] 


Integument:   Denies rash. [] 


Neurologic:   Denies headache, focal weakness or sensory changes. [] 


Endocrine:   Denies polyuria or polydipsia. [] 


Lymphatic:  Denies swollen glands. [] 


Psychiatric:  Denies depression or anxiety. []





Heart Score:


C/O Chest Pain:  N/A


Risk Factors:


Risk Factors:  DM, Current or recent (<one month) smoker, HTN, HLP, family 

history of CAD, obesity.


Risk Scores:


Score 0 - 3:  2.5% MACE over next 6 weeks - Discharge Home


Score 4 - 6:  20.3% MACE over next 6 weeks - Admit for Clinical Observation


Score 7 - 10:  72.7% MACE over next 6 weeks - Early Invasive Strategies





Allergies:


Allergies:





Allergies








Coded Allergies Type Severity Reaction Last Updated Verified


 


  Penicillins Allergy Intermediate  1/29/22 Yes











Physical Exam:


PE:





Constitutional: Well developed, well nourished, no acute distress, non-toxic 

appearance. []


HENT: Normocephalic, atraumatic, bilateral external ears normal, oropharynx 

moist, no oral exudates, nose normal. []


Eyes: PERRLA, EOMI, conjunctiva normal, no discharge. [] 


Neck: Normal range of motion, no tenderness, supple, no stridor. [] 


Cardiovascular:Heart rate regular rhythm, no murmur []


Lungs & Thorax:  Bilateral breath sounds clear to auscultation []


Abdomen: Bowel sounds normal, soft, no tenderness, no masses, no pulsatile 

masses. [] 


Skin: Warm, dry, no erythema, no rash. [] 


Back: No tenderness, no CVA tenderness. [] 


Extremities: No tenderness, no cyanosis, no clubbing, ROM intact, no edema. [] 


Neurologic: Alert and oriented X 3, normal motor function, normal sensory 

function, no focal deficits noted. []


Psychologic: Affect normal, judgement normal, mood normal. []





Current Patient Data:


Vital Signs:





                                   Vital Signs








  Date Time  Temp Pulse Resp B/P (MAP) Pulse Ox O2 Delivery O2 Flow Rate FiO2


 


1/29/22 21:06  64 18 168/50 (89) 94 Room Air  


 


1/29/22 19:36 97.7       





 97.7       











EKG:


EKG:


[]





Radiology/Procedures:


Radiology/Procedures:


[]





Course & Med Decision Making:


Course & Med Decision Making


Pertinent Labs and Imaging studies reviewed. (See chart for details)





[]





Dragon Disclaimer:


Dragon Disclaimer:


This electronic medical record was generated, in whole or in part, using a voice

 recognition dictation system.





Departure


Departure


Impression:  


   Primary Impression:  


   Sore throat


   Additional Impression:  


   Esophageal foreign body


Disposition:  01 HOME / SELF CARE / HOMELESS


Condition:  STABLE


Referrals:  


YUE MILES MD (PCP)


Patient Instructions:  Swallowed Foreign Body, Adult











KJ PATEL DO            Jan 29, 2022 21:37

## 2022-02-10 ENCOUNTER — HOSPITAL ENCOUNTER (OUTPATIENT)
Dept: HOSPITAL 61 - MAMMO | Age: 87
End: 2022-02-10
Attending: INTERNAL MEDICINE
Payer: MEDICARE

## 2022-02-10 DIAGNOSIS — M67.441: ICD-10-CM

## 2022-02-10 DIAGNOSIS — Z01.818: Primary | ICD-10-CM

## 2022-02-10 DIAGNOSIS — I73.00: ICD-10-CM

## 2022-02-10 PROCEDURE — 77066 DX MAMMO INCL CAD BI: CPT

## 2022-02-10 PROCEDURE — 76641 ULTRASOUND BREAST COMPLETE: CPT

## 2022-02-10 NOTE — RAD
EXAM: Bilateral digital diagnostic mammogram with tomosynthesis; bilateral breast sonogram.



HISTORY: 86-year-old female presents with bilateral breast irritation. The patient reports the irrita
tion involving the skin along the inferior aspect of both breasts.



TECHNIQUE: Full-field digital craniocaudal and mediolateral oblique 2D and 3D tomosynthesis images of
 both breasts are obtained for evaluation. Computer aided detection was applied. Sonographic imaging 
of both breasts targeted to sites of mammographic findings was performed.



COMPARISON: Sonogram and mammogram dated 6/30/2020, and mammograms dated 6/17/2020, 4/4/2019, 4/2/201
8, 3/29/2017, 3/1/2016, 2/5/2015, 2/4/2014.



BREAST PARENCHYMAL DENSITY: Level B - Scattered fibroglandular densities.



FINDINGS: There is architectural distortion within the 9:00 position of the right breast at mid depth
 which is not appreciably changed compared to tomosynthesis images dated 4/4/2019. The nearly 3 year 
course of stability and absence of a suspicious sonographic correlate and this location favors benign
ity. There is stable asymmetry in this location on studies dating to 2/4/2014.



There are additional areas of asymmetry and nodularity within both breasts which are stable in appear
ance. There are multiple scattered and clustered benign-appearing microcalcifications. No convincing 
suspicious microcalcifications is seen.



Sonographic imaging of the right breast demonstrates a hypoechoic focus with posterior shadowing delia
uring 5 mm at the 9:00 position 5 cm from the nipple. This likely corresponds with a coarse calcifica
tion dislocation demonstrated mammographically. There is no blood flow within this location to sugges
t neoplasm. There is no convincing abnormal finding within the 9:00 position at mid depth correspondi
ng with the region of mammographic distortion. There are benign axillary lymph nodes.



Sonographic imaging of the left breast demonstrates a 6 mm hypoechoic lesion at the 12:00 subareolar 
location, the appearance of which favors a benign fibrocystic or fibroadenomatoid lesion. There is a 
5 mm similar-appearing suspected complicated cystic or fibroadenomatoid lesion at the 11:00 position 
4 cm the nipple. There are benign axillary lymph nodes.



IMPRESSION: 

1. Persistent asymmetry with distortion within the 9:00 position of the right breast compared to mult
iple studies dating to 2/4/2014. The long-term stability and absence of a suspicious sonographic cathi
elate favors benignity.

2. Small suspected benign fibrocystic or fibroadenomatoid lesions within the 12:00 and 1:00 positions
 of the left breast.

3. BI-RADS Category 3: Probably benign finding(s). Short term follow up with a right breast diagnosti
c mammogram and bilateral breast sonogram in 6 months is recommended. There is increased conspicuity 
of the mammographic finding within the 9:00 position of the right breast and no sonographic correlate
 at the time of follow-up, 3D stereotactic biopsy can be considered for definitive diagnosis. 







If your mammogram demonstrates that you have dense breast tissue, which could hide abnormalities, and
 if you have other risk factors for breast cancer that have been identified, you might benefit from s
upplemental screening tests that may be suggested by your ordering physician.  Dense breast tissue, i
n and of itself, is a relatively common condition.  This information is not provided to cause undue c
oncern, but rather to raise your awareness and to promote discussion with your physician regarding th
e presence of other risk factors, in addition to dense breast tissue. A report of your mammography re
sults will be sent to you and your physician.  You should contact your physician if you have any ques
tions or concerns regarding this report.  



Mammography is a sensitive method for finding small breast cancers, but it does not detect them all a
nd is not a substitute for careful clinical examination.  A negative mammogram does not negate a clin
ically suspicious finding and should not result in delay in biopsying a clinically suspicious abnorma
lity.



PQRS compliance statement -  Patient information was entered into a reminder system with a target due
 date for the next mammogram. 



"Our facility is accredited by the [employer] Mammography Program."



Electronically signed by: Antonieta Phelps MD (2/10/2022 11:01 AM) [id number]

## 2022-02-10 NOTE — RAD
EXAM: Bilateral digital diagnostic mammogram with tomosynthesis; bilateral breast sonogram.



HISTORY: 86-year-old female presents with bilateral breast irritation. The patient reports the irrita
tion involving the skin along the inferior aspect of both breasts.



TECHNIQUE: Full-field digital craniocaudal and mediolateral oblique 2D and 3D tomosynthesis images of
 both breasts are obtained for evaluation. Computer aided detection was applied. Sonographic imaging 
of both breasts targeted to sites of mammographic findings was performed.



COMPARISON: Sonogram and mammogram dated 6/30/2020, and mammograms dated 6/17/2020, 4/4/2019, 4/2/201
8, 3/29/2017, 3/1/2016, 2/5/2015, 2/4/2014.



BREAST PARENCHYMAL DENSITY: Level B - Scattered fibroglandular densities.



FINDINGS: There is architectural distortion within the 9:00 position of the right breast at mid depth
 which is not appreciably changed compared to tomosynthesis images dated 4/4/2019. The nearly 3 year 
course of stability and absence of a suspicious sonographic correlate and this location favors benign
ity. There is stable asymmetry in this location on studies dating to 2/4/2014.



There are additional areas of asymmetry and nodularity within both breasts which are stable in appear
ance. There are multiple scattered and clustered benign-appearing microcalcifications. No convincing 
suspicious microcalcifications is seen.



Sonographic imaging of the right breast demonstrates a hypoechoic focus with posterior shadowing delia
uring 5 mm at the 9:00 position 5 cm from the nipple. This likely corresponds with a coarse calcifica
tion dislocation demonstrated mammographically. There is no blood flow within this location to sugges
t neoplasm. There is no convincing abnormal finding within the 9:00 position at mid depth correspondi
ng with the region of mammographic distortion. There are benign axillary lymph nodes.



Sonographic imaging of the left breast demonstrates a 6 mm hypoechoic lesion at the 12:00 subareolar 
location, the appearance of which favors a benign fibrocystic or fibroadenomatoid lesion. There is a 
5 mm similar-appearing suspected complicated cystic or fibroadenomatoid lesion at the 11:00 position 
4 cm the nipple. There are benign axillary lymph nodes.



IMPRESSION: 

1. Persistent asymmetry with distortion within the 9:00 position of the right breast compared to mult
iple studies dating to 2/4/2014. The long-term stability and absence of a suspicious sonographic cathi
elate favors benignity.

2. Small suspected benign fibrocystic or fibroadenomatoid lesions within the 12:00 and 1:00 positions
 of the left breast.

3. BI-RADS Category 3: Probably benign finding(s). Short term follow up with a right breast diagnosti
c mammogram and bilateral breast sonogram in 6 months is recommended. There is increased conspicuity 
of the mammographic finding within the 9:00 position of the right breast and no sonographic correlate
 at the time of follow-up, 3D stereotactic biopsy can be considered for definitive diagnosis. 







If your mammogram demonstrates that you have dense breast tissue, which could hide abnormalities, and
 if you have other risk factors for breast cancer that have been identified, you might benefit from s
upplemental screening tests that may be suggested by your ordering physician.  Dense breast tissue, i
n and of itself, is a relatively common condition.  This information is not provided to cause undue c
oncern, but rather to raise your awareness and to promote discussion with your physician regarding th
e presence of other risk factors, in addition to dense breast tissue. A report of your mammography re
sults will be sent to you and your physician.  You should contact your physician if you have any ques
tions or concerns regarding this report.  



Mammography is a sensitive method for finding small breast cancers, but it does not detect them all a
nd is not a substitute for careful clinical examination.  A negative mammogram does not negate a clin
ically suspicious finding and should not result in delay in biopsying a clinically suspicious abnorma
lity.



[employer] compliance statement -  Patient information was entered into a reminder system with a target due
 date for the next mammogram. 



"Our facility is accredited by the [employer] Mammography Program."



Electronically signed by: Antonieta Phelps MD (2/10/2022 11:01 AM) [id number]